# Patient Record
Sex: MALE | Race: WHITE | Employment: OTHER | URBAN - METROPOLITAN AREA
[De-identification: names, ages, dates, MRNs, and addresses within clinical notes are randomized per-mention and may not be internally consistent; named-entity substitution may affect disease eponyms.]

---

## 2017-09-27 ENCOUNTER — HOSPITAL ENCOUNTER (INPATIENT)
Facility: CLINIC | Age: 82
LOS: 1 days | Discharge: HOME | DRG: 708 | End: 2017-09-28
Attending: UROLOGY | Admitting: UROLOGY
Payer: MEDICARE

## 2017-09-27 ENCOUNTER — ANESTHESIA (OUTPATIENT)
Dept: SURGERY | Facility: CLINIC | Age: 82
DRG: 708 | End: 2017-09-27
Payer: MEDICARE

## 2017-09-27 ENCOUNTER — ANESTHESIA EVENT (OUTPATIENT)
Dept: SURGERY | Facility: CLINIC | Age: 82
DRG: 708 | End: 2017-09-27
Payer: MEDICARE

## 2017-09-27 DIAGNOSIS — C61 PROSTATE CANCER (H): Primary | ICD-10-CM

## 2017-09-27 LAB
ABO + RH BLD: NORMAL
ABO + RH BLD: NORMAL
BLD GP AB SCN SERPL QL: NORMAL
BLOOD BANK CMNT PATIENT-IMP: NORMAL
CREAT SERPL-MCNC: 0.98 MG/DL (ref 0.66–1.25)
GFR SERPL CREATININE-BSD FRML MDRD: 73 ML/MIN/1.7M2
POTASSIUM SERPL-SCNC: 3.9 MMOL/L (ref 3.4–5.3)
SPECIMEN EXP DATE BLD: NORMAL

## 2017-09-27 PROCEDURE — 86901 BLOOD TYPING SEROLOGIC RH(D): CPT | Performed by: SURGERY

## 2017-09-27 PROCEDURE — 88305 TISSUE EXAM BY PATHOLOGIST: CPT | Mod: 26 | Performed by: UROLOGY

## 2017-09-27 PROCEDURE — 25800025 ZZH RX 258: Performed by: UROLOGY

## 2017-09-27 PROCEDURE — 82565 ASSAY OF CREATININE: CPT | Performed by: SURGERY

## 2017-09-27 PROCEDURE — 71000015 ZZH RECOVERY PHASE 1 LEVEL 2 EA ADDTL HR: Performed by: UROLOGY

## 2017-09-27 PROCEDURE — A9270 NON-COVERED ITEM OR SERVICE: HCPCS | Performed by: UROLOGY

## 2017-09-27 PROCEDURE — S0020 INJECTION, BUPIVICAINE HYDRO: HCPCS | Performed by: UROLOGY

## 2017-09-27 PROCEDURE — 0VT30ZZ RESECTION OF BILATERAL SEMINAL VESICLES, OPEN APPROACH: ICD-10-PCS | Performed by: UROLOGY

## 2017-09-27 PROCEDURE — 27210995 ZZH RX 272: Performed by: UROLOGY

## 2017-09-27 PROCEDURE — 36000087 ZZH SURGERY LEVEL 8 EA 15 ADDTL MIN: Performed by: UROLOGY

## 2017-09-27 PROCEDURE — 36000085 ZZH SURGERY LEVEL 8 1ST 30 MIN: Performed by: UROLOGY

## 2017-09-27 PROCEDURE — 0VBQ0ZZ EXCISION OF BILATERAL VAS DEFERENS, OPEN APPROACH: ICD-10-PCS | Performed by: UROLOGY

## 2017-09-27 PROCEDURE — 37000009 ZZH ANESTHESIA TECHNICAL FEE, EACH ADDTL 15 MIN: Performed by: UROLOGY

## 2017-09-27 PROCEDURE — 25000128 H RX IP 250 OP 636: Performed by: UROLOGY

## 2017-09-27 PROCEDURE — 27211024 ZZHC OR SUPPLY OTHER OPNP: Performed by: UROLOGY

## 2017-09-27 PROCEDURE — 40000169 ZZH STATISTIC PRE-PROCEDURE ASSESSMENT I: Performed by: UROLOGY

## 2017-09-27 PROCEDURE — 88305 TISSUE EXAM BY PATHOLOGIST: CPT | Performed by: UROLOGY

## 2017-09-27 PROCEDURE — 37000008 ZZH ANESTHESIA TECHNICAL FEE, 1ST 30 MIN: Performed by: UROLOGY

## 2017-09-27 PROCEDURE — 88309 TISSUE EXAM BY PATHOLOGIST: CPT | Performed by: UROLOGY

## 2017-09-27 PROCEDURE — 88309 TISSUE EXAM BY PATHOLOGIST: CPT | Mod: 26 | Performed by: UROLOGY

## 2017-09-27 PROCEDURE — 86850 RBC ANTIBODY SCREEN: CPT | Performed by: SURGERY

## 2017-09-27 PROCEDURE — 25000128 H RX IP 250 OP 636: Performed by: SURGERY

## 2017-09-27 PROCEDURE — 86900 BLOOD TYPING SEROLOGIC ABO: CPT | Performed by: SURGERY

## 2017-09-27 PROCEDURE — 8E0W0CZ ROBOTIC ASSISTED PROCEDURE OF TRUNK REGION, OPEN APPROACH: ICD-10-PCS | Performed by: UROLOGY

## 2017-09-27 PROCEDURE — 27210794 ZZH OR GENERAL SUPPLY STERILE: Performed by: UROLOGY

## 2017-09-27 PROCEDURE — 25000566 ZZH SEVOFLURANE, EA 15 MIN: Performed by: UROLOGY

## 2017-09-27 PROCEDURE — 25000125 ZZHC RX 250: Performed by: UROLOGY

## 2017-09-27 PROCEDURE — 25000132 ZZH RX MED GY IP 250 OP 250 PS 637: Mod: GY | Performed by: UROLOGY

## 2017-09-27 PROCEDURE — 84132 ASSAY OF SERUM POTASSIUM: CPT | Performed by: SURGERY

## 2017-09-27 PROCEDURE — 25000125 ZZHC RX 250: Performed by: NURSE ANESTHETIST, CERTIFIED REGISTERED

## 2017-09-27 PROCEDURE — 25000128 H RX IP 250 OP 636: Performed by: NURSE ANESTHETIST, CERTIFIED REGISTERED

## 2017-09-27 PROCEDURE — A9270 NON-COVERED ITEM OR SERVICE: HCPCS | Mod: GY | Performed by: UROLOGY

## 2017-09-27 PROCEDURE — 12000000 ZZH R&B MED SURG/OB

## 2017-09-27 PROCEDURE — 71000014 ZZH RECOVERY PHASE 1 LEVEL 2 FIRST HR: Performed by: UROLOGY

## 2017-09-27 PROCEDURE — 25000125 ZZHC RX 250: Performed by: SURGERY

## 2017-09-27 PROCEDURE — 0VT00ZZ RESECTION OF PROSTATE, OPEN APPROACH: ICD-10-PCS | Performed by: UROLOGY

## 2017-09-27 PROCEDURE — 36415 COLL VENOUS BLD VENIPUNCTURE: CPT | Performed by: SURGERY

## 2017-09-27 RX ORDER — SODIUM CHLORIDE, SODIUM LACTATE, POTASSIUM CHLORIDE, CALCIUM CHLORIDE 600; 310; 30; 20 MG/100ML; MG/100ML; MG/100ML; MG/100ML
INJECTION, SOLUTION INTRAVENOUS CONTINUOUS PRN
Status: DISCONTINUED | OUTPATIENT
Start: 2017-09-27 | End: 2017-09-27

## 2017-09-27 RX ORDER — LIDOCAINE 40 MG/G
CREAM TOPICAL
Status: DISCONTINUED | OUTPATIENT
Start: 2017-09-27 | End: 2017-09-28 | Stop reason: HOSPADM

## 2017-09-27 RX ORDER — PROPOFOL 10 MG/ML
INJECTION, EMULSION INTRAVENOUS CONTINUOUS PRN
Status: DISCONTINUED | OUTPATIENT
Start: 2017-09-27 | End: 2017-09-27

## 2017-09-27 RX ORDER — CALCIUM CARBONATE 500 MG/1
500-1000 TABLET, CHEWABLE ORAL 4 TIMES DAILY PRN
Status: DISCONTINUED | OUTPATIENT
Start: 2017-09-27 | End: 2017-09-28 | Stop reason: HOSPADM

## 2017-09-27 RX ORDER — HYDROMORPHONE HYDROCHLORIDE 1 MG/ML
.3-.5 INJECTION, SOLUTION INTRAMUSCULAR; INTRAVENOUS; SUBCUTANEOUS EVERY 5 MIN PRN
Status: DISCONTINUED | OUTPATIENT
Start: 2017-09-27 | End: 2017-09-27 | Stop reason: HOSPADM

## 2017-09-27 RX ORDER — AMOXICILLIN 250 MG
1-2 CAPSULE ORAL 2 TIMES DAILY
Status: DISCONTINUED | OUTPATIENT
Start: 2017-09-27 | End: 2017-09-28 | Stop reason: HOSPADM

## 2017-09-27 RX ORDER — KETOROLAC TROMETHAMINE 30 MG/ML
15 INJECTION, SOLUTION INTRAMUSCULAR; INTRAVENOUS
Status: DISCONTINUED | OUTPATIENT
Start: 2017-09-27 | End: 2017-09-27 | Stop reason: HOSPADM

## 2017-09-27 RX ORDER — NEOSTIGMINE METHYLSULFATE 1 MG/ML
VIAL (ML) INJECTION PRN
Status: DISCONTINUED | OUTPATIENT
Start: 2017-09-27 | End: 2017-09-27

## 2017-09-27 RX ORDER — ONDANSETRON 2 MG/ML
INJECTION INTRAMUSCULAR; INTRAVENOUS PRN
Status: DISCONTINUED | OUTPATIENT
Start: 2017-09-27 | End: 2017-09-27

## 2017-09-27 RX ORDER — NAPROXEN 250 MG/1
250 TABLET ORAL EVERY 12 HOURS PRN
Status: DISCONTINUED | OUTPATIENT
Start: 2017-09-27 | End: 2017-09-28 | Stop reason: HOSPADM

## 2017-09-27 RX ORDER — PROPOFOL 10 MG/ML
INJECTION, EMULSION INTRAVENOUS PRN
Status: DISCONTINUED | OUTPATIENT
Start: 2017-09-27 | End: 2017-09-27

## 2017-09-27 RX ORDER — FENTANYL CITRATE 0.05 MG/ML
25-50 INJECTION, SOLUTION INTRAMUSCULAR; INTRAVENOUS
Status: DISCONTINUED | OUTPATIENT
Start: 2017-09-27 | End: 2017-09-27 | Stop reason: HOSPADM

## 2017-09-27 RX ORDER — ONDANSETRON 2 MG/ML
4 INJECTION INTRAMUSCULAR; INTRAVENOUS EVERY 30 MIN PRN
Status: DISCONTINUED | OUTPATIENT
Start: 2017-09-27 | End: 2017-09-27 | Stop reason: HOSPADM

## 2017-09-27 RX ORDER — NEOMYCIN/BACITRACIN/POLYMYXINB 3.5-400-5K
OINTMENT (GRAM) TOPICAL
Status: DISCONTINUED | OUTPATIENT
Start: 2017-09-27 | End: 2017-09-28 | Stop reason: HOSPADM

## 2017-09-27 RX ORDER — DIPHENHYDRAMINE HYDROCHLORIDE 50 MG/ML
12.5 INJECTION INTRAMUSCULAR; INTRAVENOUS EVERY 6 HOURS PRN
Status: DISCONTINUED | OUTPATIENT
Start: 2017-09-27 | End: 2017-09-28 | Stop reason: HOSPADM

## 2017-09-27 RX ORDER — EPHEDRINE SULFATE 50 MG/ML
INJECTION, SOLUTION INTRAMUSCULAR; INTRAVENOUS; SUBCUTANEOUS PRN
Status: DISCONTINUED | OUTPATIENT
Start: 2017-09-27 | End: 2017-09-27

## 2017-09-27 RX ORDER — GABAPENTIN 100 MG/1
100 CAPSULE ORAL 3 TIMES DAILY
Status: DISCONTINUED | OUTPATIENT
Start: 2017-09-27 | End: 2017-09-28 | Stop reason: HOSPADM

## 2017-09-27 RX ORDER — ONDANSETRON 4 MG/1
4 TABLET, ORALLY DISINTEGRATING ORAL EVERY 30 MIN PRN
Status: DISCONTINUED | OUTPATIENT
Start: 2017-09-27 | End: 2017-09-27 | Stop reason: HOSPADM

## 2017-09-27 RX ORDER — ACETAMINOPHEN 325 MG/1
650 TABLET ORAL EVERY 4 HOURS PRN
Status: DISCONTINUED | OUTPATIENT
Start: 2017-09-30 | End: 2017-09-28 | Stop reason: HOSPADM

## 2017-09-27 RX ORDER — ACETAMINOPHEN 325 MG/1
975 TABLET ORAL EVERY 8 HOURS
Status: DISCONTINUED | OUTPATIENT
Start: 2017-09-27 | End: 2017-09-27

## 2017-09-27 RX ORDER — FENTANYL CITRATE 50 UG/ML
INJECTION, SOLUTION INTRAMUSCULAR; INTRAVENOUS PRN
Status: DISCONTINUED | OUTPATIENT
Start: 2017-09-27 | End: 2017-09-27

## 2017-09-27 RX ORDER — BUPIVACAINE HYDROCHLORIDE 2.5 MG/ML
INJECTION, SOLUTION INFILTRATION; PERINEURAL PRN
Status: DISCONTINUED | OUTPATIENT
Start: 2017-09-27 | End: 2017-09-27 | Stop reason: HOSPADM

## 2017-09-27 RX ORDER — SODIUM CHLORIDE, SODIUM LACTATE, POTASSIUM CHLORIDE, CALCIUM CHLORIDE 600; 310; 30; 20 MG/100ML; MG/100ML; MG/100ML; MG/100ML
INJECTION, SOLUTION INTRAVENOUS CONTINUOUS
Status: DISCONTINUED | OUTPATIENT
Start: 2017-09-27 | End: 2017-09-27 | Stop reason: HOSPADM

## 2017-09-27 RX ORDER — DEXAMETHASONE SODIUM PHOSPHATE 4 MG/ML
INJECTION, SOLUTION INTRA-ARTICULAR; INTRALESIONAL; INTRAMUSCULAR; INTRAVENOUS; SOFT TISSUE PRN
Status: DISCONTINUED | OUTPATIENT
Start: 2017-09-27 | End: 2017-09-27

## 2017-09-27 RX ORDER — LIDOCAINE HYDROCHLORIDE 20 MG/ML
INJECTION, SOLUTION INFILTRATION; PERINEURAL PRN
Status: DISCONTINUED | OUTPATIENT
Start: 2017-09-27 | End: 2017-09-27

## 2017-09-27 RX ORDER — ONDANSETRON 2 MG/ML
4 INJECTION INTRAMUSCULAR; INTRAVENOUS EVERY 6 HOURS PRN
Status: DISCONTINUED | OUTPATIENT
Start: 2017-09-27 | End: 2017-09-28 | Stop reason: HOSPADM

## 2017-09-27 RX ORDER — VECURONIUM BROMIDE 1 MG/ML
INJECTION, POWDER, LYOPHILIZED, FOR SOLUTION INTRAVENOUS PRN
Status: DISCONTINUED | OUTPATIENT
Start: 2017-09-27 | End: 2017-09-27

## 2017-09-27 RX ORDER — ACETAMINOPHEN 325 MG/1
650 TABLET ORAL EVERY 4 HOURS PRN
Status: DISCONTINUED | OUTPATIENT
Start: 2017-09-30 | End: 2017-09-27

## 2017-09-27 RX ORDER — ONDANSETRON 4 MG/1
4 TABLET, ORALLY DISINTEGRATING ORAL EVERY 6 HOURS PRN
Status: DISCONTINUED | OUTPATIENT
Start: 2017-09-27 | End: 2017-09-28 | Stop reason: HOSPADM

## 2017-09-27 RX ORDER — HYDROMORPHONE HYDROCHLORIDE 2 MG/1
2-4 TABLET ORAL
Status: DISCONTINUED | OUTPATIENT
Start: 2017-09-27 | End: 2017-09-28 | Stop reason: HOSPADM

## 2017-09-27 RX ORDER — ACETAMINOPHEN 325 MG/1
975 TABLET ORAL EVERY 8 HOURS
Status: DISCONTINUED | OUTPATIENT
Start: 2017-09-27 | End: 2017-09-28 | Stop reason: HOSPADM

## 2017-09-27 RX ORDER — NEOMYCIN/BACITRACIN/POLYMYXINB 3.5-400-5K
OINTMENT (GRAM) TOPICAL 2 TIMES DAILY
Status: DISCONTINUED | OUTPATIENT
Start: 2017-09-27 | End: 2017-09-28 | Stop reason: HOSPADM

## 2017-09-27 RX ORDER — CEFAZOLIN SODIUM 2 G/100ML
2 INJECTION, SOLUTION INTRAVENOUS
Status: COMPLETED | OUTPATIENT
Start: 2017-09-27 | End: 2017-09-27

## 2017-09-27 RX ORDER — MAGNESIUM HYDROXIDE 1200 MG/15ML
LIQUID ORAL PRN
Status: DISCONTINUED | OUTPATIENT
Start: 2017-09-27 | End: 2017-09-27 | Stop reason: HOSPADM

## 2017-09-27 RX ORDER — GLYCOPYRROLATE 0.2 MG/ML
INJECTION, SOLUTION INTRAMUSCULAR; INTRAVENOUS PRN
Status: DISCONTINUED | OUTPATIENT
Start: 2017-09-27 | End: 2017-09-27

## 2017-09-27 RX ORDER — NALOXONE HYDROCHLORIDE 0.4 MG/ML
.1-.4 INJECTION, SOLUTION INTRAMUSCULAR; INTRAVENOUS; SUBCUTANEOUS
Status: DISCONTINUED | OUTPATIENT
Start: 2017-09-27 | End: 2017-09-28 | Stop reason: HOSPADM

## 2017-09-27 RX ORDER — SODIUM CHLORIDE 9 MG/ML
INJECTION, SOLUTION INTRAVENOUS CONTINUOUS
Status: DISCONTINUED | OUTPATIENT
Start: 2017-09-27 | End: 2017-09-28 | Stop reason: HOSPADM

## 2017-09-27 RX ORDER — METOPROLOL TARTRATE 1 MG/ML
1-2 INJECTION, SOLUTION INTRAVENOUS EVERY 5 MIN PRN
Status: DISCONTINUED | OUTPATIENT
Start: 2017-09-27 | End: 2017-09-27 | Stop reason: HOSPADM

## 2017-09-27 RX ORDER — HYDROMORPHONE HYDROCHLORIDE 2 MG/1
2-4 TABLET ORAL EVERY 4 HOURS PRN
Status: DISCONTINUED | OUTPATIENT
Start: 2017-09-27 | End: 2017-09-27

## 2017-09-27 RX ORDER — DIPHENHYDRAMINE HCL 12.5MG/5ML
12.5 LIQUID (ML) ORAL EVERY 6 HOURS PRN
Status: DISCONTINUED | OUTPATIENT
Start: 2017-09-27 | End: 2017-09-28 | Stop reason: HOSPADM

## 2017-09-27 RX ADMIN — DEXMEDETOMIDINE HYDROCHLORIDE 0.6 MCG/KG/HR: 100 INJECTION, SOLUTION INTRAVENOUS at 12:36

## 2017-09-27 RX ADMIN — SODIUM CHLORIDE, POTASSIUM CHLORIDE, SODIUM LACTATE AND CALCIUM CHLORIDE: 600; 310; 30; 20 INJECTION, SOLUTION INTRAVENOUS at 12:43

## 2017-09-27 RX ADMIN — Medication 5 MG: at 15:08

## 2017-09-27 RX ADMIN — Medication 5 MG: at 15:19

## 2017-09-27 RX ADMIN — VECURONIUM BROMIDE 1 MG: 1 INJECTION, POWDER, LYOPHILIZED, FOR SOLUTION INTRAVENOUS at 14:57

## 2017-09-27 RX ADMIN — PROPOFOL 150 MG: 10 INJECTION, EMULSION INTRAVENOUS at 12:36

## 2017-09-27 RX ADMIN — GLYCOPYRROLATE 0.1 MG: 0.2 INJECTION, SOLUTION INTRAMUSCULAR; INTRAVENOUS at 14:04

## 2017-09-27 RX ADMIN — VECURONIUM BROMIDE 2 MG: 1 INJECTION, POWDER, LYOPHILIZED, FOR SOLUTION INTRAVENOUS at 14:15

## 2017-09-27 RX ADMIN — VECURONIUM BROMIDE 3 MG: 1 INJECTION, POWDER, LYOPHILIZED, FOR SOLUTION INTRAVENOUS at 13:08

## 2017-09-27 RX ADMIN — LIDOCAINE HYDROCHLORIDE 1 ML: 10 INJECTION, SOLUTION EPIDURAL; INFILTRATION; INTRACAUDAL; PERINEURAL at 11:35

## 2017-09-27 RX ADMIN — DEXMEDETOMIDINE HYDROCHLORIDE: 100 INJECTION, SOLUTION INTRAVENOUS at 14:34

## 2017-09-27 RX ADMIN — PROPOFOL 150 MCG/KG/MIN: 10 INJECTION, EMULSION INTRAVENOUS at 12:36

## 2017-09-27 RX ADMIN — HYDROMORPHONE HYDROCHLORIDE 0.5 MG: 1 INJECTION, SOLUTION INTRAMUSCULAR; INTRAVENOUS; SUBCUTANEOUS at 14:43

## 2017-09-27 RX ADMIN — Medication 5 MG: at 15:05

## 2017-09-27 RX ADMIN — GABAPENTIN 100 MG: 100 CAPSULE ORAL at 21:57

## 2017-09-27 RX ADMIN — HYDROMORPHONE HYDROCHLORIDE 2 MG: 2 TABLET ORAL at 18:47

## 2017-09-27 RX ADMIN — SODIUM CHLORIDE, POTASSIUM CHLORIDE, SODIUM LACTATE AND CALCIUM CHLORIDE: 600; 310; 30; 20 INJECTION, SOLUTION INTRAVENOUS at 14:15

## 2017-09-27 RX ADMIN — PROPOFOL: 10 INJECTION, EMULSION INTRAVENOUS at 14:05

## 2017-09-27 RX ADMIN — ACETAMINOPHEN 975 MG: 325 TABLET, FILM COATED ORAL at 21:58

## 2017-09-27 RX ADMIN — SODIUM CHLORIDE, POTASSIUM CHLORIDE, SODIUM LACTATE AND CALCIUM CHLORIDE: 600; 310; 30; 20 INJECTION, SOLUTION INTRAVENOUS at 11:35

## 2017-09-27 RX ADMIN — FENTANYL CITRATE 100 MCG: 50 INJECTION, SOLUTION INTRAMUSCULAR; INTRAVENOUS at 12:36

## 2017-09-27 RX ADMIN — FENTANYL CITRATE 50 MCG: 50 INJECTION, SOLUTION INTRAMUSCULAR; INTRAVENOUS at 13:08

## 2017-09-27 RX ADMIN — GLYCOPYRROLATE 0.7 MG: 0.2 INJECTION, SOLUTION INTRAMUSCULAR; INTRAVENOUS at 15:23

## 2017-09-27 RX ADMIN — GABAPENTIN 100 MG: 100 CAPSULE ORAL at 18:47

## 2017-09-27 RX ADMIN — CEFAZOLIN SODIUM 2 G: 2 INJECTION, SOLUTION INTRAVENOUS at 12:41

## 2017-09-27 RX ADMIN — LIDOCAINE HYDROCHLORIDE 70 MG: 20 INJECTION, SOLUTION INFILTRATION; PERINEURAL at 12:36

## 2017-09-27 RX ADMIN — VECURONIUM BROMIDE 2 MG: 1 INJECTION, POWDER, LYOPHILIZED, FOR SOLUTION INTRAVENOUS at 13:48

## 2017-09-27 RX ADMIN — NEOSTIGMINE METHYLSULFATE 3.5 MG: 1 INJECTION INTRAMUSCULAR; INTRAVENOUS; SUBCUTANEOUS at 15:23

## 2017-09-27 RX ADMIN — BACITRACIN ZINC, NEOMYCIN, POLYMYXIN B: 400; 3.5; 5 OINTMENT TOPICAL at 21:57

## 2017-09-27 RX ADMIN — ONDANSETRON 4 MG: 2 INJECTION INTRAMUSCULAR; INTRAVENOUS at 14:43

## 2017-09-27 RX ADMIN — DEXAMETHASONE SODIUM PHOSPHATE 4 MG: 4 INJECTION, SOLUTION INTRA-ARTICULAR; INTRALESIONAL; INTRAMUSCULAR; INTRAVENOUS; SOFT TISSUE at 12:48

## 2017-09-27 RX ADMIN — SODIUM CHLORIDE: 9 INJECTION, SOLUTION INTRAVENOUS at 18:03

## 2017-09-27 RX ADMIN — SENNOSIDES AND DOCUSATE SODIUM 2 TABLET: 8.6; 5 TABLET ORAL at 21:58

## 2017-09-27 RX ADMIN — ROCURONIUM BROMIDE 50 MG: 10 INJECTION INTRAVENOUS at 12:36

## 2017-09-27 RX ADMIN — CEFAZOLIN SODIUM 1 G: 2 INJECTION, SOLUTION INTRAVENOUS at 14:40

## 2017-09-27 RX ADMIN — Medication 5 MG: at 15:22

## 2017-09-27 ASSESSMENT — PAIN DESCRIPTION - DESCRIPTORS: DESCRIPTORS: DULL;ACHING

## 2017-09-27 ASSESSMENT — ENCOUNTER SYMPTOMS: DYSRHYTHMIAS: 0

## 2017-09-27 NOTE — ANESTHESIA CARE TRANSFER NOTE
Patient: Shorty Beverly    Procedure(s):  DAVINCI XI PROSTATECTOMY  - Wound Class: II-Clean Contaminated    Diagnosis: PROSTATE CA  Diagnosis Additional Information: No value filed.    Anesthesia Type:   General, ETT     Note:  Airway :Face Mask and Oral Airway  Patient transferred to:PACU  Comments: Pt to PACU with oral airway in place and O2 via mask, airway patent, VSS.  Report to RN.      Vitals: (Last set prior to Anesthesia Care Transfer)    CRNA VITALS  9/27/2017 1527 - 9/27/2017 1607      9/27/2017             Pulse: 67    SpO2: 99 %    Resp Rate (observed): 10    Resp Rate (set): 10                Electronically Signed By: NIKKI Sandhu CRNA  September 27, 2017  4:07 PM

## 2017-09-27 NOTE — PROGRESS NOTES
Admission medication history interview status for the 9/27/2017  admission is complete. See EPIC admission navigator for prior to admission medications     Medication history source reliability:Good    Medication history interview source(s):Patient    Medication history resources (including written lists, pill bottles, clinic record):Patient mailed in his medication list prior to surgery    Primary pharmacy.[email or web address]    Additional medication history information not noted on PTA med list :None    Time spent in this activity: 40 minutes    Prior to Admission medications    Medication Sig Last Dose Taking? Auth Provider   GLUCOSAMINE-CHONDROITIN PO Take 1 tablet by mouth daily 1500mg/1200mg 9/25/2017 at AM Yes Reported, Patient   Cyanocobalamin (VITAMIN B 12 PO) Take 2,500 mcg by mouth daily 9/25/2017 at AM Yes Reported, Patient   Acetaminophen (TYLENOL PO) Take 500-1,000 mg by mouth daily  9/25/2017 at AM Yes Reported, Patient   Cholecalciferol (VITAMIN D3 PO) Take 5,000 Units by mouth daily  9/25/2017 at AM Yes Reported, Patient   multivitamin, therapeutic with minerals (MULTI-VITAMIN) TABS Take 1 tablet by mouth daily. 9/25/2017 at AM Yes Reported, Patient   TAMSULOSIN HCL PO Take 0.4 mg by mouth daily. 9/25/2017 at AM Yes Reported, Patient

## 2017-09-27 NOTE — ANESTHESIA PREPROCEDURE EVALUATION
"Procedure: Procedure(s):  DAVINCI XI PROSTATECTOMY  Preop diagnosis: PROSTATE CA    Allergies   Allergen Reactions     Demerol      Severe drop in b/p     Penicillins Swelling     Doesn't know where swelling was     Valium Other (See Comments)     Hallucinations and \"spaced out\"     Past Medical History:   Diagnosis Date     Anemia      Anxiety      Anxiety state, unspecified      Arthritis     osteoarthritis of both knees     BPH (benign prostatic hyperplasia)      Hyperlipemia      Hypertension      Infected prosthetic knee joint (H)      Prostate hypertrophy      Spinal stenosis      Tremor      Tremor, essential      Past Surgical History:   Procedure Laterality Date     ARTHROPLASTY REVISION KNEE  1/17/2013    Procedure: ARTHROPLASTY REVISION KNEE;  LEFT TOTAL KNEE REVISION (KARINA OUT)^; PLACE ANTIOBIOTIC SPACER (BIOMET)^;  Surgeon: Fátima Ivan MD;  Location:  OR     ARTHROPLASTY REVISION KNEE  3/26/2013    Procedure: ARTHROPLASTY REVISION KNEE;  LEFT REVISION TOTAL KNEE ARTHROPLASTY (J&J)^;  Surgeon: Fátima Ivan MD;  Location:  OR     COLONOSCOPY       DECOMPRESSION, FUSION LUMBAR POSTERIOR THREE + LEVELS, COMBINED N/A 10/11/2016    Procedure: COMBINED DECOMPRESSION, FUSION LUMBAR POSTERIOR THREE + LEVELS;  Surgeon: Armand Duarte MD;  Location:  OR     ENT SURGERY      T&A     EYE SURGERY      T and A     HEMORRHOIDECTOMY       HERNIA REPAIR      right     INCISION LIMBAL RELAXING, KERATOTOMY ARCUATE  7/15/2014    Procedure: INCISION LIMBAL RELAXING, KERATOTOMY ARCUATE;  Surgeon: Nicolás Og MD;  Location: [facility]     ORTHOPEDIC SURGERY      elizabeth total knees,trigger fingerx2     PHACOEMULSIFICATION CLEAR CORNEA WITH STANDARD INTRAOCULAR LENS IMPLANT  4/22/2014    Procedure: PHACOEMULSIFICATION CLEAR CORNEA WITH STANDARD INTRAOCULAR LENS IMPLANT;  Surgeon: Nicolás Og MD;  Location: [facility]     PHACOEMULSIFICATION CLEAR CORNEA WITH STANDARD INTRAOCULAR LENS IMPLANT  7/15/2014    " Procedure: PHACOEMULSIFICATION CLEAR CORNEA WITH STANDARD INTRAOCULAR LENS IMPLANT;  Surgeon: Nicolás Og MD;  Location: [facility]     Prior to Admission medications    Medication Sig Start Date End Date Taking? Authorizing Provider   GLUCOSAMINE-CHONDROITIN PO Take 1 tablet by mouth daily 1500mg/1200mg   Yes Reported, Patient   Cyanocobalamin (VITAMIN B 12 PO) Take 2,500 mcg by mouth daily   Yes Reported, Patient   Acetaminophen (TYLENOL PO) Take 500-1,000 mg by mouth daily    Yes Reported, Patient   Cholecalciferol (VITAMIN D3 PO) Take 5,000 Units by mouth daily    Yes Reported, Patient   multivitamin, therapeutic with minerals (MULTI-VITAMIN) TABS Take 1 tablet by mouth daily.   Yes Reported, Patient   TAMSULOSIN HCL PO Take 0.4 mg by mouth daily.   Yes Reported, Patient     Current Facility-Administered Medications Ordered in Epic   Medication Dose Route Frequency Last Rate Last Dose     lidocaine 1 % 1 mL  1 mL Other Q1H PRN         lactated ringers infusion   Intravenous Continuous         No current [facility]-ordered outpatient prescriptions on file.     Wt Readings from Last 1 Encounters:   10/11/16 76 kg (167 lb 8 oz)     Temp Readings from Last 1 Encounters:   10/14/16 36.8  C (98.2  F) (Oral)     BP Readings from Last 6 Encounters:   10/14/16 109/62   11/29/15 116/78   07/15/14 120/85   04/22/14 116/76   03/29/13 98/57   01/20/13 122/69     Pulse Readings from Last 4 Encounters:   10/12/16 79     Resp Readings from Last 1 Encounters:   10/14/16 16     SpO2 Readings from Last 1 Encounters:   10/14/16 95%     Recent Labs   Lab Test  10/14/16   0800  11/29/15   1835  03/26/13   1316   NA   --   138  141   POTASSIUM   --   4.0  4.5   CHLORIDE   --   106  103   CO2   --   26  28   ANIONGAP   --   6  11   GLC   --   120*  92   BUN   --   22  20   CR  0.88  1.15  0.83   MARIO   --   8.4*  9.8     Recent Labs   Lab Test  11/29/15   1835   AST  15   ALT  19     Recent Labs   Lab Test  10/12/16   0700  11/29/15   1835    03/26/13   1316   WBC   --   12.6*   --   5.7   HGB  9.7*  13.6   < >  12.9*   PLT   --   173   --   196    < > = values in this interval not displayed.     Recent Labs   Lab Test  11/29/15   1835  03/29/13   0622   INR  1.09  1.51*      No results for input(s): TROPI in the last [id number] hours.  RECENT LABS:   ECG:   ECHO:   CXR:      Anesthesia Evaluation     .             ROS/MED HX    ENT/Pulmonary:  - neg pulmonary ROS     Neurologic:     (+)other neuro tremor, essential; spinal stenosis s/p lumbar    Cardiovascular:     (+) Dyslipidemia, hypertension----. : . . . :. .      (-) CAD, CHF, DELEON and arrhythmias   METS/Exercise Tolerance:  >4 METS   Hematologic:     (+) Anemia, -      Musculoskeletal:   (+) arthritis, , , -       GI/Hepatic:  - neg GI/hepatic ROS       Renal/Genitourinary:     (+) BPH,       Endo:  - neg endo ROS       Psychiatric:     (+) psychiatric history anxiety      Infectious Disease:  - neg infectious disease ROS       Malignancy:         Other:                     Physical Exam  Normal systems: dental    Airway   Mallampati: I  TM distance: >3 FB  Neck ROM: full    Dental     Cardiovascular   Rhythm and rate: regular and normal      Pulmonary    breath sounds clear to auscultation                    Anesthesia Plan      History & Physical Review  History and physical reviewed and following examination; no interval change.    ASA Status:  2 .    NPO Status:  > 8 hours    Plan for General and ETT with Intravenous and Propofol induction. Maintenance will be Balanced.    PONV prophylaxis:  Ondansetron (or other 5HT-3) and Dexamethasone or Solumedrol  precedex gtt  Propofol gtt  Less than 0.5 mac inhalational  No midazolam        Postoperative Care  Postoperative pain management:  IV analgesics.      Consents  Anesthetic plan, risks, benefits and alternatives discussed with:  Patient..                          .

## 2017-09-27 NOTE — IP AVS SNAPSHOT
MRN:[id number]                      After Visit Summary   9/27/2017    Shorty Beverly    MRN: [id number]           Thank you!     Thank you for choosing [facility] for your care. Our goal is always to provide you with excellent care. Hearing back from our patients is one way we can continue to improve our services. Please take a few minutes to complete the written survey that you may receive in the mail after you visit with us. Thank you!        Patient Information     Date Of Birth          1935        Designated Caregiver       Most Recent Value    Caregiver    Will someone help with your care after discharge? yes    Name of designated caregiver Pat spouse    Phone number of caregiver [phone]    Caregiver address [address] [place] [address]      About your hospital stay     You were admitted on:  September 27, 2017 You last received care in the:  [facility] [address] [facility]    You were discharged on:  September 28, 2017        Reason for your hospital stay       You were in the hospital to have surgery on your prostate and to recover from surgery                  Who to Call     For medical emergencies, please call 911.  For non-urgent questions about your medical care, please call your primary care provider or clinic, [phone]  For questions related to your surgery, please call your surgery clinic        Attending Provider     Provider Specialty    Jean Montoya MD Urology       Primary Care Provider Office Phone # Fax #    Christian Escobar [phone]      After Care Instructions     Activity       Your activity upon discharge: Avoid strenuous activity, including lifting over 15 pounds or straining for a bowel movement, for the next month while you are healing from surgery. No driving while a catheter is in place or if you are taking narcotic pain meds (Percocet, Norco, Oxycodone, Hydrocodone, etc.)            Diet       Be sure to keep  yourself well hydrated by drinking plenty of fluids & eat smaller, more frequent meals while you are healing.Try to eat more healthy foods & avoid junk food.            Discharge Instructions       -You have a dave catheter in place to allow for healing of your urinary system.   You will need to see your urology doctor to have the catheter checked and subsequently removed at your outpatient follow up appointment.  -Do not soak or take a tub bath (no swimming or going in a hot tub) while your catheter remains in place. You may shower with the catheter in. You may use a wet or soapy wash cloth to clean the outside of the catheter and the groin area as needed for hygiene, just make sure to avoid pulling on the catheter. Always secure the catheter to your thigh to prevent pulling.  -Look at the appearance and amount of drainage that comes from the catheter into the bag. Call the [facility] office if the drain falls out, the drainage has bright red blood that does not clear within 48 hours, the drainage has a foul smell, the drainage has pus in it or you have any other concerns.  -For male patients: You may apply a small amount of bacitracin/neosporin ointment to the tip of your penis 2-4 times daily while your catheter is in place to help reduce irritation.    Your incision was closed using surgical staples that will be removed at your follow up appointment. You can leave the stapled incision open to air 24 hours after surgery. You should remove the bandages over your incisions within 48 hours after surgery (if they're left on longer, the bonding of the adhesive to your skin may become firmly attached and remove skin when they're removed, causing a painful sore). There are no specific cares needed aside from keeping the area clean and dry, but you can use an over the counter ointment such as bacitracin or neosporin on the incisions if you prefer. You may shower normally, but avoid scrubbing that area or using  harsh soaps. Pat dry after shower. Do not soak in the tub, swim, or otherwise submerge your surgical site in water until your staples have been removed and your incision is well healed.    The SEB drain site will ooze for the first 24-48 hours after the drain is removed. You should keep the dressing over the site until it scabs over and you should keep the area dry during this time. After 24-48 hours, you can remove the dressing and leave it open to air. After 24-48 hours, you can get the site wet, but you should avoid submerging it in water (no tub baths, no swimming, etc) until it is well-healed (2-4 weeks).     You should expect some gas and bloating discomfort in your abdomen after surgery, particularly for the first week. This is normal. To help manage this, you should eat small light meals, take frequent short walks, and use as little narcotic pain medicine as you can to maintain a comfortable pain level,    You may resume your over the counter aspirin, NSAIDS (ibuprofen, naproxen, aleve, etc.), and vitamins/dietary supplements 24 hours after your catheter has been removed.    You are being prescribed a narcotic pain medication. This is a strong pain medication that should be used to keep your pain to a tolerable level, but no more than necessary as it can be addictive and has side effects. With the narcotic pain medicine, you might experience some constipation, so you may want to take an over the counter stool softener like dulcolax or a fiber supplement such as Metamucil or Benefiber to keep your bowel movements soft and regular. You should also be very careful with driving, as the pain medicine may dull your senses and reflexes. Avoid alcohol, as this can unexpectedly increase the effect of the pain medicine.    If there are any problems that concern you enough to visit an emergency room in the next month or two, please return to the hospital you had surgery at, as the staff at that hospital will be familiar  "with you, your procedure, and your surgeon as well as have access to all of your records to make sure your surgical site is healing well.                  Follow-up Appointments     Follow Up       Please follow up with our office nurse on Wednesday, 10/11/17 at 9:30am for a routine post-hospital check and catheter removal    [facility]  [address] [place] [address], [address]  [place]. [address]    You may call [phone] with any questions or concerns or if you need to cancel/reschedule appointment.            Follow Up       Please follow up with our office nurse on Friday, 10/6/17 at 1pm for a routine post-hospital check and staple removal    [facility]  [address], [address]  [place]. [address]    You may call [phone] with any questions or concerns or if you need to cancel/reschedule appointment.                  Pending Results     No orders found for last 3 day(s).            Statement of Approval     Ordered          09/28/17 1565  I have reviewed and agree with all the recommendations and orders detailed in this document.  EFFECTIVE NOW     Approved and electronically signed by:  Isa Cameron PA-C             Admission Information     Date & Time Provider Department Dept. Phone    9/27/2017 Jean Montoya MD [facility] [address] [facility] [phone]      Your Vitals Were     Blood Pressure Temperature Respirations Height Weight Pulse Oximetry    110/65 (BP Location: Right arm) 98.4  F (36.9  C) (Oral) 16 1.651 m (5' 5\") 73 kg (161 lb) 98%    BMI (Body Mass Index)                   26.79 kg/m2           [email or web address] Information     [employer] lets you send messages to your doctor, view your test results, renew your prescriptions, schedule appointments and more. To sign up, go to [email or web address] . Click on \"Log in\" on the left side of the screen, which will take you to the Welcome page. Then click on \"Sign up Now\" on the right side of the page. "     You will be asked to enter the access code listed below, as well as some personal information. Please follow the directions to create your username and password.     Your access code is: [email or web address]  Expires: 2017  5:29 PM     Your access code will  in 90 days. If you need help or a new code, please call your [facility] clinic or [phone].        Care EveryWhere ID     This is your Care EveryWhere ID. This could be used by other organizations to access your [facility] medical records  [id number]        Equal Access to Services     [facility]: Hadchrissy Logan, alix saleem, saranya delacruz, sommer golden . So [facility] [phone].    ATENCIÓN: Si habla español, tiene a warren disposición servicios gratuitos de asistencia lingüística. Llame al [phone].    We comply with applicable federal civil rights laws and [place] laws. We do not discriminate on the basis of race, color, national origin, age, disability sex, sexual orientation or gender identity.               Review of your medicines      START taking        Dose / Directions    oxyCODONE 5 MG IR tablet   Commonly known as:  ROXICODONE   Notes to Patient:  Take as needed for pain.        Dose:  5-10 mg   Take 1-2 tablets (5-10 mg) by mouth every 4 hours as needed for moderate to severe pain   Quantity:  20 tablet   Refills:  0         CONTINUE these medicines which have NOT CHANGED        Dose / Directions    GLUCOSAMINE-CHONDROITIN PO        Dose:  1 tablet   Take 1 tablet by mouth daily 1500mg/1200mg   Refills:  0       Multi-vitamin Tabs tablet        Dose:  1 tablet   Take 1 tablet by mouth daily.   Refills:  0       TYLENOL PO        Dose:  500-1000 mg   Take 500-1,000 mg by mouth daily   Refills:  0       VITAMIN B 12 PO        Dose:  2500 mcg   Take 2,500 mcg by mouth daily   Refills:  0       VITAMIN D3 PO        Dose:  5000 Units   Take 5,000 Units by mouth daily   Refills:  0          STOP taking     TAMSULOSIN HCL PO                Where to get your medicines      Some of these will need a paper prescription and others can be bought over the counter. Ask your nurse if you have questions.     Bring a paper prescription for each of these medications     oxyCODONE 5 MG IR tablet                Protect others around you: Learn how to safely use, store and throw away your medicines at www.disposemymeds.org.             Medication List: This is a list of all your medications and when to take them. Check marks below indicate your daily home schedule. Keep this list as a reference.      Medications           Morning Afternoon Evening Bedtime As Needed    GLUCOSAMINE-CHONDROITIN PO   Take 1 tablet by mouth daily 1500mg/1200mg                                Multi-vitamin Tabs tablet   Take 1 tablet by mouth daily.                                oxyCODONE 5 MG IR tablet   Commonly known as:  ROXICODONE   Take 1-2 tablets (5-10 mg) by mouth every 4 hours as needed for moderate to severe pain   Notes to Patient:  Take as needed for pain.                                   TYLENOL PO   Take 500-1,000 mg by mouth daily   Last time this was given:  975 mg on 9/28/2017  1:00 PM                                VITAMIN B 12 PO   Take 2,500 mcg by mouth daily                                VITAMIN D3 PO   Take 5,000 Units by mouth daily                                          More Information        Discharge Instructions for Radical Prostatectomy  You had a procedure called radical prostatectomy (removal of the entire prostate and surrounding tissues). This sheet will help you know what to do following surgery.  Activity    Don t drive until your healthcare provider says it s OK. This is usually after your catheter is removed and you are no longer taking pain medicine.    For the first 2 weeks after surgery, limit physical activity. This will allow your body to rest and heal.    Talk to your healthcare  provider before going back to your normal activity level.    Don t lift anything heavier than 10 pounds until your healthcare provider says it s OK.    Avoid long car rides.    Avoid climbing stairs and strenuous exercise. Don t mow the lawn or use a vacuum .    Take naps if you feel tired.  Home care    Avoid constipation:    Eat fruits, vegetables, and whole grains.    Unless directed otherwise, drink 6 glasses to 8 glasses of water a day (enough to keep your urine light colored). This will also help keep a healthy flow of urine.    Use a laxative or a stool softener if your healthcare provider says it s OK.    Take care of your catheter. Ask for an information sheet and training before leaving the hospital:    Keep the catheter well secured.    Use either leg bags or external (straight drainage) bags, or both.    Empty your bag when it s half full. You may notice some blood in the bag. This is normal after surgery and while the catheter is in place.    Use plain soap and water to wash the catheter and the head of your penis daily, or more often if needed.    Return to your normal diet.    Shower as usual.    Be sure to finish the antibiotics that your doctor prescribed.    Be sure to take pain medicine if needed and as prescribed.    Consider wearing sweat pants while you have the catheter. They may be more comfortable than other pants.  Follow-up  Make a follow-up appointment as directed.  When to call your healthcare provider  Call your healthcare provider right away if you have any of the following:    Fever above 100.4 F (38 C) or shaking chills    Heavy bleeding, clots, or bright red blood from the catheter    Catheter that falls out or stops draining    Foul-smelling discharge from your catheter    Redness, swelling, warmth, or pain at your incision site    Drainage, pus, or bleeding from your incision    Trouble breathing    Hives or rash    Nausea and vomiting    Diarrhea   Date Last Reviewed:  1/1/2017 2000-2017 [employer]. [address] [place] [address]. All rights reserved. This information is not intended as a substitute for professional medical care. Always follow your healthcare professional's instructions.                Pierson Catheter Care    A Pierson catheter is a rubber tube that is placed through the urethra (opening where urine comes out) and into the bladder. This helps drain urine from the bladder. There is a small balloon on the end of the tube that is inflated after insertion. This keeps the catheter from sliding out of the bladder.  A Pierson catheter is used to treat urinary retention (unable to pass urine). It is also used when there is incontinence (loss of bladder control).  Home care    Finish taking any prescribed antibiotic even if you are feeling better before then.    It is important to keep bacteria from getting into the collection bag. Do not disconnect the catheter from the collection bag.    Use a leg band to secure the drainage tube, so it does not pull on the catheter. Drain the collection bag when it becomes full using the drain spout at the bottom of the bag.    Do not try to pull or remove your catheter. This will injure your urethra. It must be removed by your healthcare provider or nurse.  Follow-up care  Follow up with your healthcare provider as advised for repeat urine testing and catheter removal or replacement.  When to seek medical advice  Call your healthcare provider right away if any of these occur:    Fever of 100.4 F (38 C) or higher, or as directed by your healthcare provider    Bladder pain or fullness    Abdominal swelling, nausea or vomiting, or back pain    Blood or urine leakage around the catheter    Bloody urine coming from the catheter (if a new symptom)    Catheter falls out    Catheter stops draining for 6 hours    Weakness, dizziness, or fainting  Date Last Reviewed: 10/1/2016    4517-3516 [employer]. [facility] [place] [address]. All rights reserved. This information is not intended as a substitute for professional medical care. Always follow your healthcare professional's instructions.                Discharge Instructions: Caring for Your Indwelling Urinary Catheter  You have been discharged with an indwelling urinary catheter (also called a Pierson catheter). A catheter is a thin, flexible tube. An indwelling urinary catheter has two parts. The first part is a tube that drains urine from your bladder. The second part is a bag or other device that collects the urine.  The most important thing to remember is that you want to prevent infection. Always wash your hands before handling your catheter bag or tubing.  Draining the bedside bag    Wash your hands with soap and clean, running water or use an alcohol-based hand  that contains at least 60% alcohol.    Hold the drainage tube over a toilet or measuring container.    Unclamp the tube and let the bag drain.    Don t touch the tip of the drainage tube or let it touch the toilet or container.  Cleaning the drainage tube    When the bag is empty, clean the tip of the drainage tube with an alcohol wipe.    Clamp the tube.    Reinsert the tube into the pocket on the drainage bag.  Cleaning your skin and tubing    Clean the skin near the catheter with soap and water.    Wash your genital area from front to back.    Wash the catheter tubing. Always wash the catheter in the direction away from your body.    You will be told when and how to change your bag and tubing.    Don t try to remove the catheter by yourself.    You may shower with the catheter in place.  Emptying a leg bag    Wash your hands.    Remove the stopper on the bag.    Drain the bag into the toilet or a measuring container. Don t let the tip of the drainage tube touch anything, including your fingers.    Clean the tip of the drainage tube with alcohol.    Replace the  stopper.  Follow-up care  Make a follow-up appointment as directed by your healthcare provider  When to call your healthcare provider  Call your healthcare provider right away if you have any of the following:    Chills or fever above 100.4 F (38 C)    Leakage around the catheter insertion site    Increased spasms (uncontrollable twitching) in your legs, abdomen, or bladder. Occasional mild spasms are normal.    Burning in the urinary tract, penis, or genital area    Nausea and vomiting    Aching in the lower back    Cloudy or bloody (pink or red) urine, sediment or mucus in the urine, or foul-smelling urine   Date Last Reviewed: 1/1/2017 2000-2017 [employer]. [address] [place] [address], [place] [address]. All rights reserved. This information is not intended as a substitute for professional medical care. Always follow your healthcare professional's instructions.                Discharge Instructions: Caring for Your Leg Bag  You are going home with a urinary catheter and collection device (drainage bag) in place. One type of collection device is called a leg bag. This is a smaller drainage bag that you can wear on your leg to collect urine during the day. The bag can fit under your clothing. You can move around with greater ease when using a leg bag instead of a larger collection bag.  You were shown how to care for your catheter in the hospital. This sheet will help you remember those steps when you are at home.  Home care    Wash your hands thoroughly before and after you care for your catheter or collection device.    Gather your supplies:    Alcohol wipes    Soap and water    Towel and washcloth    Leg strap and leg bag    Use soap and water to wash the area where your catheter enters your body. Rinse well.    Secure the bag bentley to your leg:    Position the leg band high on your thigh with the product label pointing away from your leg.    Stretch the leg band in place and fasten.    Place the  catheter tubing over the bag and secure it. You may secure it with a Velcro tab or other method, depending on the product you use. Be sure to leave enough loop in the catheter above the leg band to avoid pulling on the tube.    Every 4 to 6 hours, reposition the band to prevent pressure from the elastic on your leg. You can do this by changing the bag to the other leg or by raising or lowering the leg band.    Wash the band as frequently as needed. You can hand wash and dry the leg band.    Place the bag in the bag bentley.    Clean the urine bag end of the catheter and your catheter port with an alcohol wipe.    Place a towel under the bag and port to keep urine from dripping onto your leg.    Before connecting the outlet valve at the bottom of the bag to the catheter, make sure that it is firmly closed. Flip the valve upward toward the bag; it needs to snap firmly in place. Be sure not to tug on the tubing. Be gentle.    Attach the urine bag to the end of the catheter; insert the connector snugly into the catheter port. You can avoid dribbling urine by bending the catheter tubing just below the tip and holding it while you disconnect it from the catheter. Be careful to keep the tip clean while connecting the leg bag tubing to the catheter--this keeps germs from getting into the system.    Drain the bag when it is full. To drain the bag, flip the clamp downward. Direct the flexible outlet tube to control the flow of urine. You don t have to disconnect the leg bag from the catheter to empty it. Raise your leg up to the edge of the toilet to reach the leg bag. Then you can empty the bag directly into the toilet. This way, you won t need to bend over, which may be uncomfortable.    Keep the leg bag clean. Rinse daily with equal parts water and vinegar to reduce odor and keep the bag free of germs.    Remember to keep the drainage bag below the level of your bladder for proper drainage.  Follow-up  Make a follow-up  appointment as directed by your healthcare provider.  When to call your healthcare provider  Call your healthcare provider right away if you have any of the following:    Redness, swelling, or warmth around the catheter entry site    Pus draining from your catheter entry site or into the catheter tubing and bag    Blood, clots, or floating debris in the urine    Nausea and vomiting    Shaking chills    Fever above 100.4 F (38 C)    Pain that is not relieved by medicine     Catheter that falls out or is dislodged   Date Last Reviewed: 1/1/2017 2000-2017 [employer]. [address] [place] [address]. All rights reserved. This information is not intended as a substitute for professional medical care. Always follow your healthcare professional's instructions.                Emptying and Cleaning Your Urinary Catheter Bag  You have an indwelling urinary catheter. This drains urine from your bladder into a bag. The bag can be one that is used at the bedside. Or it can be a smaller bag that is strapped to the leg. Follow the numbered steps below to empty and clean a urinary bag.       Drain Clean tube Clean catheter   Step 1. Drain the bag    Wash your hands well with soap and water to prevent infecting the urinary catheter and bag.    If the short drainage tube is inserted into a pocket on the bag, take the drainage tube out of the pocket.    Hold the drainage tube over a toilet or measuring container. Open the valve.    Don t touch the tip of the valve or let it touch the toilet or container.    Wash your hands again.  Step 2. Clean the drainage tube    When the bag is empty, clean the tip of the drainage valve with an alcohol wipe.    Close the valve.    Reinsert the drainage tube into the pocket, if there is one.  Step 3. Clean your skin    Wash your hands well before and after cleaning your skin.    If you have a catheter (such as a Pierson) that enters through the urethra, clean the urethral  area with soap and water 1 time(s) daily as you were taught by your healthcare provider. You should also clean after every bowel movement to prevent infection.    Avoid pulling on the tubing when cleaning so you don t injure the urethra.    Don t apply antibiotic ointment or any other antibacterial product to the urethra.    Don t use lubricant on the urethra.    Don t apply powder to the genital area or to the tubing.    If you have a suprapubic catheter  (one that was surgically placed into the bladder through the lower abdomen), your healthcare provider will tell you how to clean your skin around the catheter.  Step 4. Check and clean the catheter tubing    Check the tubing. If there are kinks, cracks, clogs, or you can t see into the tubing, you ll need to change to new tubing as you were shown by your healthcare provider.    If the current tubing can still be used, wash it with soap and water. Always wash the tubing in the direction away from your body. Avoid pulling on the tubing.    Dry the tubing with a clean washcloth or paper towel.  Step 5. Clean the drainage bag    Clean the drainage bag once every 3 days.    Have a clean backup bag or other drainage device ready.    Follow these steps:    Wash your hands well with soap and water.    Disconnect the bag from the catheter tubing. Connect the tubing to the backup bag or drainage device.    Drain any remaining urine from the bag you just disconnected. Close the drainage valve.    Pour some warm soapy water into the bag. Swish the soap around, being sure to get the corners of the bag.    Open the drainage valve to drain the soap. Close the valve.    Fill the bag with 2 parts vinegar and 3 parts water. Shake the solution a bit and allow it to remain in the bag for 30 minutes.    Drain the vinegar solution and rinse the bag with cold tap water.    Hang the bag to drain and air-dry.  When to call your healthcare provider  Call your healthcare provider right away  if you have any of the following:    Little or no urine flowing into the bag    Urine leaking where the catheter enters the body    Pain, burning, or redness of the area where the catheter enters the body    Bloody urine (a trace of blood is normal)    Cloudy or foul-smelling urine, or sand-like grains in your urine    Pain in your lower back or lower abdomen    Your catheter falls out    Fever of 100.4 F (38 C) or higher, or shaking chills   Date Last Reviewed: 1/1/2017 2000-2017 [employer]. [address] [place], [place] [id number]. All rights reserved. This information is not intended as a substitute for professional medical care. Always follow your healthcare professional's instructions.

## 2017-09-27 NOTE — PROCEDURES
UROLOGY OPERATIVE REPORT    Shorty Beverly  1935, 82 year old    SURGEON  Surgeon(s):  Jean Montoya MD    PREOP DIAGNOSIS  Prostate cancer    POSTOP DIAGNOSIS  Same    PROCEDURE  Procedure(s):  DAVINCI XI PROSTATECTOMY      FINDINGS  Large prostate    ANESTHESIA  General    STAFF  Circulator: Kevin Wooten RN  Relief Circulator: Mildred Armenta RN; Josie Stevens RN  Relief Scrub: Dai Alvarenga; Ruth Hammond  Scrub Person: Helena Ferrer  First Assistant: Satya Clement    COMPLICATIONS  None    SPECIMEN  To path with bladder neck margin taken from the posterior bladder neck.     PROSTHESIS/ GRAFTS/ DEVICES  None    EBL  25cc    TECHNIQUE  The patient was taken to the operating room and placed in the dorsal lithotomy position and his abdomen and genitalia were shaved, prepped and draped in a sterile fashion. A surgical timeout was taken to verify the correct site of surgery as well as the correct patient demographic information. A 2 cm vertical incision was made above the umbilicus and cautery used to identify the external oblique fascia. This was incised and an 0 Vicryl stitch placed through each side of the pinpoint incision. The Veress needle was then passed through this when the fascia was elevated cephalad. Water was placed through the needle and easily drained through without return and then the gas was hooked up and the abdomen distended to 15 cm of water pressure.   The abdomen was viewed and there was no bowel injury and the locations for the additional ports were identified and 3 additional 8 mm ports were placed for the robot arms and then 2 assistant ports were placed as well. These were placed in standard robotic position with blunt trocars used under direct vision. There was no bowel or vascular injury during any of the port placements. Next, attention was placed towards docking the robot after the patient was put in the steep Trendelenburg  position.     There were some sigmoid attachments that needed to be brought down and then the cul-de-sac was identified and the peritoneum incised over this. The vas deferens were seen bilaterally and dissected free and then the seminal vesicles were clipped at their apex and divided from the supporting tissue.     Next, the median umbilical ligaments were divided and dropped dorsally to drop the bladder from the anterior abdominal wall. The space of Retzius was then fully developed and the fat overlying the prostate was all removed. At this point, the dorsal venous complex could be seen along with the pubic ramus. The endorectal fascia was incised bilaterally and the dorsal venous complex secured with 0 Vicryl. The bladder was then divided from the prostate and the seminal vesicles and vas deferens were pulled ventrally. Care was taken to not buttonhole the bladder neck and this was a clean dissection. The pedicles were taken quite close to the prostate in an effort to preserve nerve function for erectile function postoperatively. The pedicles were clipped and divided. The attachments around the urethra were then further sharply dissected and the urethra divided.   Reconstruction was done using a posterior 3-0 barbed suture through the periurethral tissues and bladder neck. Then the anastomosis was performed with 3-0 V lock in a running fashion but with a bolster at the knot. Another anterior supporting suture with 3.0 Vicryl was used to bolster it. Anastomosis was checked for leakage which there was none and the final 18 [place] Pierson was placed with 15 cc in the balloon. The prostate was removed by lengthening the midline incision. This was then closed with 0 Vicryl and anesthetized with 0.25% Marcaine. All incisions were then closed with staples and the Pierson was secured and the patient transferred to PACU in stable condition.       PLAN  Admit overnight and discharge to home tomorrow evening.   Pierson out in  12-16 days with a nursing visit if I'm not available.       Jean Montoya MD

## 2017-09-27 NOTE — IP AVS SNAPSHOT
[address], [address]    [facility] [address]    Phone:  [phone]                                       After Visit Summary   9/27/2017    Shorty Beverly    MRN: [id number]           After Visit Summary Signature Page     I have received my discharge instructions, and my questions have been answered. I have discussed any challenges I see with this plan with the nurse or doctor.    ..........................................................................................................................................  Patient/Patient Representative Signature      ..........................................................................................................................................  Patient Representative Print Name and Relationship to Patient    ..................................................               ................................................  Date                                            Time    ..........................................................................................................................................  Reviewed by Signature/Title    ...................................................              ..............................................  Date                                                            Time

## 2017-09-27 NOTE — ANESTHESIA POSTPROCEDURE EVALUATION
Patient: Shorty Beverly    Procedure(s):  DAVINCI XI PROSTATECTOMY  - Wound Class: II-Clean Contaminated    Diagnosis:PROSTATE CA  Diagnosis Additional Information: No value filed.    Anesthesia Type:  General, ETT    Note:  Anesthesia Post Evaluation    Patient location during evaluation: PACU  Patient participation: Able to fully participate in evaluation  Level of consciousness: sleepy but conscious and responsive to verbal stimuli  Pain management: adequate  Airway patency: patent  Cardiovascular status: acceptable and hemodynamically stable  Respiratory status: acceptable and unassisted  Hydration status: acceptable  PONV: none     Anesthetic complications: None          Last vitals:  Vitals:    09/27/17 1640 09/27/17 1650 09/27/17 1700   BP: 108/66 92/59 108/62   Resp: 16 24 16   Temp:  36.1  C (97  F) 36.1  C (97  F)   SpO2: 100% 99% 99%         Electronically Signed By: Victor Hugo Caban MD  September 27, 2017  5:08 PM

## 2017-09-28 VITALS
SYSTOLIC BLOOD PRESSURE: 110 MMHG | RESPIRATION RATE: 16 BRPM | TEMPERATURE: 98.4 F | DIASTOLIC BLOOD PRESSURE: 65 MMHG | WEIGHT: 161 LBS | HEIGHT: 65 IN | BODY MASS INDEX: 26.82 KG/M2 | OXYGEN SATURATION: 98 %

## 2017-09-28 LAB
COPATH REPORT: NORMAL
GLUCOSE BLDC GLUCOMTR-MCNC: 115 MG/DL (ref 70–99)
HGB BLD-MCNC: 11.3 G/DL (ref 13.3–17.7)

## 2017-09-28 PROCEDURE — 25000132 ZZH RX MED GY IP 250 OP 250 PS 637: Mod: GY | Performed by: UROLOGY

## 2017-09-28 PROCEDURE — 36415 COLL VENOUS BLD VENIPUNCTURE: CPT | Performed by: UROLOGY

## 2017-09-28 PROCEDURE — 25000128 H RX IP 250 OP 636: Performed by: UROLOGY

## 2017-09-28 PROCEDURE — 00000146 ZZHCL STATISTIC GLUCOSE BY METER IP

## 2017-09-28 PROCEDURE — A9270 NON-COVERED ITEM OR SERVICE: HCPCS | Mod: GY | Performed by: UROLOGY

## 2017-09-28 PROCEDURE — 85018 HEMOGLOBIN: CPT | Performed by: UROLOGY

## 2017-09-28 RX ORDER — OXYCODONE HYDROCHLORIDE 5 MG/1
5-10 TABLET ORAL EVERY 4 HOURS PRN
Qty: 20 TABLET | Refills: 0 | Status: SHIPPED | OUTPATIENT
Start: 2017-09-28

## 2017-09-28 RX ADMIN — ACETAMINOPHEN 975 MG: 325 TABLET, FILM COATED ORAL at 13:00

## 2017-09-28 RX ADMIN — SODIUM CHLORIDE: 9 INJECTION, SOLUTION INTRAVENOUS at 06:42

## 2017-09-28 RX ADMIN — GABAPENTIN 100 MG: 100 CAPSULE ORAL at 16:26

## 2017-09-28 RX ADMIN — GABAPENTIN 100 MG: 100 CAPSULE ORAL at 08:22

## 2017-09-28 RX ADMIN — ACETAMINOPHEN 975 MG: 325 TABLET, FILM COATED ORAL at 06:16

## 2017-09-28 RX ADMIN — BACITRACIN ZINC, NEOMYCIN, POLYMYXIN B: 400; 3.5; 5 OINTMENT TOPICAL at 08:24

## 2017-09-28 RX ADMIN — SENNOSIDES AND DOCUSATE SODIUM 1 TABLET: 8.6; 5 TABLET ORAL at 08:22

## 2017-09-28 NOTE — PLAN OF CARE
Problem: Patient Care Overview  Goal: Plan of Care/Patient Progress Review  Outcome: Improving  Alert and oriented x4, forgetful. VSS on room air. Abdominal discomfort controlled with scheduled tylenol and ice pack. Up SBA. Regular diet - tolerating well. Pierson patent - will go home with it. No flatus. IV SL. Encourage PO intake. Pierson catheter teaching done however will need to be reinforced when family is present to pick-up patient. Plan to discharge this evening.

## 2017-09-28 NOTE — PLAN OF CARE
Problem: Patient Care Overview  Goal: Plan of Care/Patient Progress Review  Outcome: Improving  A/o x4. VSS on RA. Overnight, pt stated abd pain is 6/10 when moving and 1/10 while resting, denied any PRN pain meds, ice pack given and effective. 5 lap sites and 1 abd incision, all WDL, scant drainage. Was advanced to full liquid diet last evening after tolerating clears, has not had any fulls yet. No flatus and dave is patent with light pink to yellow output. L PIV infusing NS at 75 ml/hr. Plan for patient to discharge today in the evening with dave. Continue to monitor.

## 2017-09-28 NOTE — PLAN OF CARE
Problem: Patient Care Overview  Goal: Plan of Care/Patient Progress Review  A/o x4. Arrived to floor around 1730. VSS. Denies nausea. 4/10 abd pain relieved with 2mg PO dilaudid. Encouraging IS use, clear LS. Lap sites and abd incision WDL with scant drainage. Tolerating clear liquid diet. No flatus, hypoactive BS. Dave patent with red output. NS infusing @ 75. MRSA precautions. Dangled at bedside. Planning for DC tomorrow evening with dave.

## 2017-09-28 NOTE — PROGRESS NOTES
"Urology POD #1  S/P Robotic assisted radical prostatectomy 9/27/17    Subjective:  Pt found sitting in chair at bedside. Pt reports he's feeling pretty well overall. Pt has minimal pain, hasn't required much pain medicine. Pt has ambulated this morning without problems. Pt is tolerating a regular diet without nausea or vomiting, though hasn't eaten much. Pt feels comfortable going home tonight.       Objective:  /71 (BP Location: Left arm)  Temp 96.8  F (36  C) (Oral)  Resp 16  Ht 1.651 m (5' 5\")  Wt 73 kg (161 lb)  SpO2 100%  BMI 26.79 kg/m2    Intake/Output Summary (Last 24 hours) at 09/28/17 1318  Last data filed at 09/28/17 1314   Gross per 24 hour   Intake             3506 ml   Output             1280 ml   Net             2226 ml     Labs:  ROUTINE IP LABS (Last four results)  BMP  Recent Labs  Lab 09/27/17  1102   POTASSIUM 3.9   CR 0.98     CBC  Recent Labs  Lab 09/28/17  0659   HGB 11.3*     Exam:  Gen: Alert and oriented, cooperative, NAD  Ab:Hypoactive BS, soft, rounded, NTTP, 6 small incisions well-approximated with staples without erythema, edema. NO SEB   : Catheter in place draining pink tinged jessica urine.  Ext: Calves soft, nttp, no edema      Assessment/Plan:  S/P Robotic assisted radical prostatectomy 9/27/17   -PO Tylenol, PO Dilaudid for pain    -Encourage ambulation, IS use   -Regular diet, small portions   -Plan to D/C this evening    Discussed exam, lab work and plan with Dr. Montoya  Please contact me with any questions or concerns.     Isa Cameron PA-C  [facility]  Pager:  [phone]  After 4pm and on weekends, please call [phone]    "

## 2017-09-29 NOTE — PROGRESS NOTES
Patient discharged at 7 PM to home.  IV's were discontinued. Pain at time of discharge was 2/10. Belongings returned to patient.  Discharge instructions and medications reviewed with patient and wife, Pat.  Patient verbalized understanding and all questions were answered. Pierson education complete. Pt discharged home with leg bag and other supplies. Urine color was jessica with small clots noted. Adequate output. Oxycodone given to patient.  At time of discharge, patient condition was stable and left the unit by wheelchair escorted by aide.       Problem: Goal Outcome Summary  Goal: Goal Outcome Summary     SLP; Pt seen for dysphagia tx. Pt had significant, thick, yellow-tinged and dry mucus throughout oral cavity. Needs aggressive oral cares. Recommend initiate full liquid diet. Straws okay. Pt must be alert, recommend assist, sit upright, take small sips/bites and alternate consistencies. SLP to follow.

## 2017-10-07 NOTE — DISCHARGE SUMMARY
Urology Discharge Summary:     Admit date: 9/27/2017  Discharge date: 9/28/2017    Diagnosis: Prostate cancer  Procedure: Robotic assisted radical prostatectomy     Hospital course:  Pt admitted for a planned Robotic assisted radical prostatectomy  with Dr. Montoya for known Prostate cancer.  Pt tolerated the procedure well with minimal blood loss and was transferred to the floor in stable condition; dave in place.  Postoperative analgesia was achieved with IV Dilaudid, pt was transitioned to oral pain medication on POD 1, which was well tolerated.  Pt remained hemodynamically stable throughout postoperative recovery.  Diet was gradually resumed with the return of bowel function which pt tolerated without nausea or vomiting.  Activity was gradually increased until pt resumed independence with transfers / ambulation.    Pt discharged home POD #1 in stable condition, pain well controlled, ambulating well and follow up care arranged. Daev catheter transitioned to leg bag and pt / family instructed on home cares prior to discharge.       Labs:  Recent Labs   Lab Test  09/28/17   0659  10/12/16   0700  11/29/15   1835   03/26/13   1316   WBC   --    --   12.6*   --   5.7   RBC   --    --   4.19*   --   4.19*   HGB  11.3*  9.7*  13.6   < >  12.9*   HCT   --    --   38.7*   --   38.1*   MCV   --    --   92   --   91   MCH   --    --   32.5   --   30.8   MCHC   --    --   35.1   --   33.9   PLT   --    --   173   --   196    < > = values in this interval not displayed.     Recent Labs   Lab Test  09/27/17   1102  11/29/15   1835  03/26/13   1316  01/18/13   0635   POTASSIUM  3.9  4.0  4.5  3.6   CHLORIDE   --   106  103  103   BUN   --   22  20  20     Pathology:  SPECIMEN(S):   A: Bladder neck margin   B: Prostate     FINAL DIAGNOSIS:   A: Bladder neck margin, resection        - Bladder neck tissue without malignancy or pathologic   abnormalities     B. PROSTATE GLAND: Radical Prostatectomy     Specimen        Procedure:  "   Radical prostatectomy        Prostate Size: See gross        Prostate Weight: See gross        Lymph Node Sampling:   No lymph nodes present     Tumor        Histologic Type:   Adenocarcinoma (acinar, not otherwise specified)        Juan C Pattern:             Primary Pattern:   Grade 4             Secondary Pattern:   Grade 3             Total [place] Score: 7     Extent        Tumor Quantitation             Proportion (p%) of prostatic tissue involved by tumor: 30% -   Large bilateral tumor             Tumor Size (dominant nodule, if present):   Greatest   dimension: At least 3 cm - Tumor extends from the prostate apex to the   bladder neck        Extraprostatic Extension:   Not identified        Seminal Vesicle Invasion:   Not identified     Margins:   Margins uninvolved by invasive carcinoma--the left apex   margin shows grade 3 adenocarcinoma involving an inked and cauterized   margin over approximately 1 mm        Focality of Tumor:    Unifocal     Accessory Findings        Treatment Effect on Carcinoma:   Not identified        Lymph-Vascular Invasion:   Not identified        Perineural Invasion:   Present     Pathologic Staging (pTNM)        Primary Tumor (pT):    pT2c:  Bilateral disease             Regional Lymph Nodes (pN):   PNX: Cannot be assessed     COMMENT:     This is a large volume bilateral peripheral zone adenocarcinoma of   prostate which is approximately 85% grade 4 and 15% grade 3.  Grade 3   adenocarcinoma involves an inked and cauterized left apical margin over   approximately 1 mm     Electronically signed out by:     Stu Rios M.D.     GROSS:   A. The specimen is received in formalin with proper patient   identification labeled \"bladder neck margin\".  The specimen consists of   pink rubbery tissue fragment measuring 0.6 x 0.4 x 0.3.  The specimen is   entirely submitted in one cassette.     B. The specimen is received in formalin and labeled\" prostate\" with the   patient's name " and proper identification.  The specimen consists of 61   gram prostate (5.8 cm from right to left, 4.1 cm from anterior to   posterior and 3.8 cm from superior to inferior) with attached right   seminal vesicle (2.7 x 1.6 x 0.7 cm) and left seminal vesicle (3.6 x 1.7   x 0.7 cm).  The specimen is oriented and inked blue for right and black   for left.  The apex and bladder neck are coned.  The remaining specimen   is serially sectioned from inferior to superior.  On section, the   specimen has a pink-tan fibromuscular center throughout.  There is no   evidence of grossly obvious tumor identified.  Representative sections   are submitted in 18 cassettes.     Cassette 1: Right apex   Cassette 2: Left apex   Cassettes 3-5: Right anterior quadrant from inferior to superior   Cassettes 6-8: Right posterior quadrant from inferior to superior   Cassettes 9-11: Left anterior quadrant from inferior to superior   Cassettes 12-14: Left posterior quadrant from inferior to superior   Cassette 15: Right bladder neck cone   Cassette 16: Left bladder neck cone   Cassette 17: Right seminal vesicle   Cassette 18: Left seminal vesicle (Dictated by: Toni Shaw 9/27/2017   04:50 PM)     CPT Codes:   A: 72583-PP4   B: 73105-OQ0     Discharge Orders     Reason for your hospital stay   You were in the hospital to have surgery on your prostate and to recover from surgery     Activity   Your activity upon discharge: Avoid strenuous activity, including lifting over 15 pounds or straining for a bowel movement, for the next month while you are healing from surgery. No driving while a catheter is in place or if you are taking narcotic pain meds (Percocet, Norco, Oxycodone, Hydrocodone, etc.)     Discharge Instructions   -You have a dave catheter in place to allow for healing of your urinary system.   You will need to see your urology doctor to have the catheter checked and subsequently removed at your outpatient follow up appointment.  -Do  not soak or take a tub bath (no swimming or going in a hot tub) while your catheter remains in place. You may shower with the catheter in. You may use a wet or soapy wash cloth to clean the outside of the catheter and the groin area as needed for hygiene, just make sure to avoid pulling on the catheter. Always secure the catheter to your thigh to prevent pulling.  -Look at the appearance and amount of drainage that comes from the catheter into the bag. Call the [facility] office if the drain falls out, the drainage has bright red blood that does not clear within 48 hours, the drainage has a foul smell, the drainage has pus in it or you have any other concerns.  -For male patients: You may apply a small amount of bacitracin/neosporin ointment to the tip of your penis 2-4 times daily while your catheter is in place to help reduce irritation.    Your incision was closed using surgical staples that will be removed at your follow up appointment. You can leave the stapled incision open to air 24 hours after surgery. You should remove the bandages over your incisions within 48 hours after surgery (if they're left on longer, the bonding of the adhesive to your skin may become firmly attached and remove skin when they're removed, causing a painful sore). There are no specific cares needed aside from keeping the area clean and dry, but you can use an over the counter ointment such as bacitracin or neosporin on the incisions if you prefer. You may shower normally, but avoid scrubbing that area or using harsh soaps. Pat dry after shower. Do not soak in the tub, swim, or otherwise submerge your surgical site in water until your staples have been removed and your incision is well healed.    The SEB drain site will ooze for the first 24-48 hours after the drain is removed. You should keep the dressing over the site until it scabs over and you should keep the area dry during this time. After 24-48 hours, you can remove the  dressing and leave it open to air. After 24-48 hours, you can get the site wet, but you should avoid submerging it in water (no tub baths, no swimming, etc) until it is well-healed (2-4 weeks).     You should expect some gas and bloating discomfort in your abdomen after surgery, particularly for the first week. This is normal. To help manage this, you should eat small light meals, take frequent short walks, and use as little narcotic pain medicine as you can to maintain a comfortable pain level,    You may resume your over the counter aspirin, NSAIDS (ibuprofen, naproxen, aleve, etc.), and vitamins/dietary supplements 24 hours after your catheter has been removed.    You are being prescribed a narcotic pain medication. This is a strong pain medication that should be used to keep your pain to a tolerable level, but no more than necessary as it can be addictive and has side effects. With the narcotic pain medicine, you might experience some constipation, so you may want to take an over the counter stool softener like dulcolax or a fiber supplement such as Metamucil or Benefiber to keep your bowel movements soft and regular. You should also be very careful with driving, as the pain medicine may dull your senses and reflexes. Avoid alcohol, as this can unexpectedly increase the effect of the pain medicine.    If there are any problems that concern you enough to visit an emergency room in the next month or two, please return to the hospital you had surgery at, as the staff at that hospital will be familiar with you, your procedure, and your surgeon as well as have access to all of your records to make sure your surgical site is healing well.     Follow Up   Please follow up with our office nurse on Wednesday, 10/11/17 at 9:30am for a routine post-hospital check and catheter removal    [facility]  [address], [address]  [place]. [address]    You may call [phone] with any questions or  concerns or if you need to cancel/reschedule appointment.     Follow Up   Please follow up with our office nurse on Friday, 10/6/17 at 1pm for a routine post-hospital check and staple removal    [facility]  [address], [address]  [place]. [address]    You may call [phone] with any questions or concerns or if you need to cancel/reschedule appointment.     Diet   Be sure to keep yourself well hydrated by drinking plenty of fluids & eat smaller, more frequent meals while you are healing.Try to eat more healthy foods & avoid junk food.       Discharge Medications   Discharge Medication List as of 9/28/2017  5:33 PM      START taking these medications    Details   oxyCODONE (ROXICODONE) 5 MG IR tablet Take 1-2 tablets (5-10 mg) by mouth every 4 hours as needed for moderate to severe pain, Disp-20 tablet, R-0, Local Print         CONTINUE these medications which have NOT CHANGED    Details   GLUCOSAMINE-CHONDROITIN PO Take 1 tablet by mouth daily 1500mg/1200mg, Historical      Cyanocobalamin (VITAMIN B 12 PO) Take 2,500 mcg by mouth daily, Historical      Acetaminophen (TYLENOL PO) Take 500-1,000 mg by mouth daily , Historical      Cholecalciferol (VITAMIN D3 PO) Take 5,000 Units by mouth daily , Historical      multivitamin, therapeutic with minerals (MULTI-VITAMIN) TABS Take 1 tablet by mouth daily., Historical         STOP taking these medications       TAMSULOSIN HCL PO Comments:   Reason for Stopping:             Isa Cameron PA-C   Physician Assistant for [facility]

## 2018-10-07 ENCOUNTER — APPOINTMENT (OUTPATIENT)
Dept: CT IMAGING | Facility: CLINIC | Age: 83
End: 2018-10-07
Attending: PHYSICIAN ASSISTANT
Payer: MEDICARE

## 2018-10-07 ENCOUNTER — HOSPITAL ENCOUNTER (EMERGENCY)
Facility: CLINIC | Age: 83
Discharge: HOME | End: 2018-10-08
Attending: EMERGENCY MEDICINE | Admitting: EMERGENCY MEDICINE
Payer: MEDICARE

## 2018-10-07 ENCOUNTER — APPOINTMENT (OUTPATIENT)
Dept: GENERAL RADIOLOGY | Facility: CLINIC | Age: 83
End: 2018-10-07
Attending: PHYSICIAN ASSISTANT
Payer: MEDICARE

## 2018-10-07 VITALS
BODY MASS INDEX: 25.33 KG/M2 | SYSTOLIC BLOOD PRESSURE: 140 MMHG | HEIGHT: 65 IN | TEMPERATURE: 97.7 F | RESPIRATION RATE: 16 BRPM | DIASTOLIC BLOOD PRESSURE: 73 MMHG | WEIGHT: 152 LBS | OXYGEN SATURATION: 97 %

## 2018-10-07 DIAGNOSIS — M25.512 ACUTE PAIN OF LEFT SHOULDER: ICD-10-CM

## 2018-10-07 DIAGNOSIS — M79.672 PAIN OF LEFT HEEL: ICD-10-CM

## 2018-10-07 DIAGNOSIS — S00.91XA ABRASION OF HEAD, INITIAL ENCOUNTER: ICD-10-CM

## 2018-10-07 DIAGNOSIS — S09.90XA CLOSED HEAD INJURY, INITIAL ENCOUNTER: Primary | ICD-10-CM

## 2018-10-07 PROCEDURE — 73030 X-RAY EXAM OF SHOULDER: CPT | Mod: LT

## 2018-10-07 PROCEDURE — 99285 EMERGENCY DEPT VISIT HI MDM: CPT | Mod: 25

## 2018-10-07 PROCEDURE — 70450 CT HEAD/BRAIN W/O DYE: CPT

## 2018-10-07 ASSESSMENT — ENCOUNTER SYMPTOMS
ARTHRALGIAS: 1
SHORTNESS OF BREATH: 0
MYALGIAS: 1
ABDOMINAL PAIN: 0

## 2018-10-07 NOTE — ED AVS SNAPSHOT
Emergency Department    [address] [facility] [address]    Phone:  [phone]    Fax:  [phone]                                       Shorty Beverly   MRN: [id number]    Department:   Emergency Department   Date of Visit:  10/7/2018           After Visit Summary Signature Page     I have received my discharge instructions, and my questions have been answered. I have discussed any challenges I see with this plan with the nurse or doctor.    ..........................................................................................................................................  Patient/Patient Representative Signature      ..........................................................................................................................................  Patient Representative Print Name and Relationship to Patient    ..................................................               ................................................  Date                                   Time    ..........................................................................................................................................  Reviewed by Signature/Title    ...................................................              ..............................................  Date                                               Time          22EPIC Rev 08/18

## 2018-10-07 NOTE — ED AVS SNAPSHOT
Emergency Department    [address] [facility] [address]    Phone:  [phone]    Fax:  [phone]                                       Shorty Beverly   MRN: [id number]    Department:   Emergency Department   Date of Visit:  10/7/2018           Patient Information     Date Of Birth          1935        Your diagnoses for this visit were:     Fall, initial encounter     Acute pain of left shoulder     Pain of left heel     Abrasion of head, initial encounter        You were seen by Trierweiler, Chad A, MD.      Follow-up Information     Follow up with Christian Escobar MD.    Specialty:  Family Practice    Why:  As needed    Contact information:    [place]  [address]  [facility] [address] [phone]          Follow up with  Emergency Department.    Specialty:  EMERGENCY MEDICINE    Why:  If symptoms worsen    Contact information:    [address] [facility] [address] [phone]        Discharge Instructions         Arthralgia    Arthralgia is the term for pain in or around the joint. It is a symptom, not a disease. This pain may involve one or more joints. In some cases, the pain moves from joint to joint.  There are many causes for joint pain. These include:    Injury    Osteoarthritis (wearing out of the joint surface)    Gout (inflammation of the joint due to crystals in the joint fluid)    Infection inside the joint      Bursitis (inflammation of the fluid-filled sacs around the joint)    Autoimmune disorders such as rheumatoid arthritis or lupus    Tendonitis (inflammation of chords that attach muscle to bone)  Home care    Rest the involved joint(s) until your symptoms improve.     You may be prescribed pain medicine. If none is prescribed, you may use acetaminophen or ibuprofen to control pain and inflammation.  Follow-up care  Follow up with your healthcare provider or as advised.  When to seek medical advice  Contact your  healthcare provider right away if any of the following occurs:    Pain, swelling, or redness of joint increases    Pain worsens or recurs after a period of improvement    Pain moves to other joints    You cannot bear weight on the affected joint     You cannot move the affected joint    Joint appears deformed    New rash appears    Fever of 100.4 F (38 C) or higher, or as directed by your healthcare provider  Date Last Reviewed: 3/1/2017    4233-3232 [employer]. [address] [place] [address], [place] [address]. All rights reserved. This information is not intended as a substitute for professional medical care. Always follow your healthcare professional's instructions.          24 Hour Appointment Hotline       To make an appointment at any [facility], call [phone] ([phone]). If you don't have a family doctor or clinic, we will help you find one. [place] clinics are conveniently located to serve the needs of you and your family.             Review of your medicines      Our records show that you are taking the medicines listed below. If these are incorrect, please call your family doctor or clinic.        Dose / Directions Last dose taken    GLUCOSAMINE-CHONDROITIN PO   Dose:  1 tablet        Take 1 tablet by mouth daily 1500mg/1200mg   Refills:  0        Multi-vitamin Tabs tablet   Dose:  1 tablet        Take 1 tablet by mouth daily.   Refills:  0        oxyCODONE IR 5 MG tablet   Commonly known as:  ROXICODONE   Dose:  5-10 mg   Quantity:  20 tablet        Take 1-2 tablets (5-10 mg) by mouth every 4 hours as needed for moderate to severe pain   Refills:  0        TYLENOL PO   Dose:  500-1000 mg        Take 500-1,000 mg by mouth daily   Refills:  0        VITAMIN B 12 PO   Dose:  2500 mcg        Take 2,500 mcg by mouth daily   Refills:  0        VITAMIN D3 PO   Dose:  5000 Units        Take 5,000 Units by mouth daily   Refills:  0                Procedures and tests performed during your  visit     Head CT w/o contrast    XR Calcaneus Left G/E 2 Views    XR Shoulder Left G/E 3 Views      Orders Needing Specimen Collection     None      Pending Results     Date and Time Order Name Status Description    10/7/2018 2336 XR Shoulder Left G/E 3 Views Preliminary     10/7/2018 2336 Head CT w/o contrast Preliminary     10/7/2018 2336 XR Calcaneus Left G/E 2 Views Preliminary             Pending Culture Results     No orders found for last 3 day(s).            Pending Results Instructions     If you had any lab results that were not finalized at the time of your Discharge, you can call the ED Lab Result RN at [phone]. You will be contacted by this team for any positive Lab results or changes in treatment. The nurses are available 7 days a week from 10A to 6:30P.  You can leave a message 24 hours per day and they will return your call.        Test Results From Your Hospital Stay              10/8/2018 12:20 AM      Narrative     XR CALCANEUS LEFT GREATER THAN 2 VIEWS   10/8/2018 12:10 AM     INDICATION: Fall, heel pain.    COMPARISON: None.        Impression     IMPRESSION: No acute fracture. Small plantar calcaneal spur.         10/8/2018 12:07 AM      Narrative     CT HEAD WITHOUT CONTRAST  10/8/2018 12:00 AM     HISTORY: Fall with head strike.     TECHNIQUE:  Axial images of the head and coronal reformations without  IV contrast material. Radiation dose for this scan was reduced using  automated exposure control, adjustment of the mA and/or kV according  to patient size, or iterative reconstruction technique.    COMPARISON: None.    FINDINGS: No intracranial hemorrhage, mass or mass effect.  No acute  infarct identified. No shift of midline structures. The ventricles are  symmetric. No skull fractures. Left frontal sinus is opacified. Mild  mucosal thickening within several ethmoid air cells.        Impression     IMPRESSION: No acute intracranial abnormality.         10/8/2018 12:20 AM      Narrative      XR SHOULDER LEFT GREATER THAN 3 VIEWS   10/8/2018 12:09 AM     INDICATION: Fall, left shoulder pain.    COMPARISON: None.        Impression     IMPRESSION: No acute fracture or dislocation. Degenerative changes of  left shoulder and AC joint. Old left rib fractures.                Clinical Quality Measure: Blood Pressure Screening     Your blood pressure was checked while you were in the emergency department today. The last reading we obtained was  BP: 140/73 . Please read the guidelines below about what these numbers mean and what you should do about them.  If your systolic blood pressure (the top number) is less than 120 and your diastolic blood pressure (the bottom number) is less than 80, then your blood pressure is normal. There is nothing more that you need to do about it.  If your systolic blood pressure (the top number) is 120-139 or your diastolic blood pressure (the bottom number) is 80-89, your blood pressure may be higher than it should be. You should have your blood pressure rechecked within a year by a primary care provider.  If your systolic blood pressure (the top number) is 140 or greater or your diastolic blood pressure (the bottom number) is 90 or greater, you may have high blood pressure. High blood pressure is treatable, but if left untreated over time it can put you at risk for heart attack, stroke, or kidney failure. You should have your blood pressure rechecked by a primary care provider within the next 4 weeks.  If your provider in the emergency department today gave you specific instructions to follow-up with your doctor or provider even sooner than that, you should follow that instruction and not wait for up to 4 weeks for your follow-up visit.        Thank you for choosing [facility]       Thank you for choosing [facility] for your care. Our goal is always to provide you with excellent care. Hearing back from our patients is one way we can continue to improve our services. Please take a  "few minutes to complete the written survey that you may receive in the mail after you visit with us. Thank you!        [employer] Information     [employer] lets you send messages to your doctor, view your test results, renew your prescriptions, schedule appointments and more. To sign up, go to [email or web address] . Click on \"Log in\" on the left side of the screen, which will take you to the Welcome page. Then click on \"Sign up Now\" on the right side of the page.     You will be asked to enter the access code listed below, as well as some personal information. Please follow the directions to create your username and password.     Your access code is: [email or web address]  Expires: 2019 12:43 AM     Your access code will  in 90 days. If you need help or a new code, please call your [place] clinic or [phone].        Care EveryWhere ID     This is your Care EveryWhere ID. This could be used by other organizations to access your [place] medical records  [id number]        Equal Access to Services     [facility]: Hadii lalitha Logan, waaxda luqadaha, qaybta kaalmavanessa delacruz, sommer golden . [facility] [phone].    ATENCIÓN: Si habla español, tiene a warren disposición servicios gratuitos de asistencia lingüística. Llame al [phone].    We comply with applicable federal civil rights laws and [place] laws. We do not discriminate on the basis of race, color, national origin, age, disability, sex, sexual orientation, or gender identity.            After Visit Summary       This is your record. Keep this with you and show to your community pharmacist(s) and doctor(s) at your next visit.                  "

## 2018-10-08 ENCOUNTER — APPOINTMENT (OUTPATIENT)
Dept: GENERAL RADIOLOGY | Facility: CLINIC | Age: 83
End: 2018-10-08
Attending: PHYSICIAN ASSISTANT
Payer: MEDICARE

## 2018-10-08 PROCEDURE — 73650 X-RAY EXAM OF HEEL: CPT | Mod: LT

## 2018-10-08 NOTE — ED PROVIDER NOTES
History     Chief Complaint:    Fall     HPI   Shorty Beverly is a 83 year old male with a history of hypertension, hyperlipidemia, prostate cancer, and tremors who presents to the ED for evaluation of left shoulder pain after a fall. The patient states that he has taking out the garbage tonight when fell. He does not remember the exact mechanism of his fall but is complaining of left shoulder pain, left heel pain, and hitting his head. He denies loss of consciousness, chest pain, dizziness, shortness of breath, abdominal pain, or headache. The patient states that his left shoulder hurts with movement but is almost pain free at rest. Additionally, he states that her has required the assistance of a walker to ambulate following the fall because his left heel hurts with weight bearing. The patient states that he has a history of dementia, however, his son states that while he does occassionally get confused he has no real medical history.    Allergies:  Demerol  Penicillins  Valium     Medications:    The patient is currently on no regular medications.     Past Medical History:    Anemia  Anxiety  BPH  Hyperlipidemia  HTN  Infected prosthetic knee joint  Spinal stenosis  Tremor  Prostate cancer    Past Surgical History:    Arthroplasty revision knee (x2)  Colonoscopy  Davinci prostatectomy  Decompression, fusion lumbar posterior three + levels  T and A (x2)   surgery  Hemorrhoidectomy  Right hernia repair  Incision limbal relaxing, keratotomy arcuate  Phacoemulsification clear cornea with standard intraocular lens implant (x2)    Family History:    No past pertinent family history.    Social History:  Former smoker, quit 6/22/1992.  Positive for alcohol use.   Marital Status:   [2]     Review of Systems   Respiratory: Negative for shortness of breath.    Cardiovascular: Negative for chest pain.   Gastrointestinal: Negative for abdominal pain.   Musculoskeletal: Positive for arthralgias and myalgias.  "  Neurological: Negative for syncope.   All other systems reviewed and are negative.      Physical Exam   First Vitals:  BP: 140/73  Heart Rate: 62  Temp: 97.7  F (36.5  C)  Resp: 16  Height: 165.1 cm (5' 5\")  Weight: 68.9 kg (152 lb)  SpO2: 97 %      Physical Exam    General: Well-nourished, appears stated age  Eyes: PERRL, conjunctivae pink no scleral icterus or conjunctival injection  ENT:  Moist mucus membranes, pharynx nonerythematous and without exudate  Respiratory:  No respiratory distress, no wheezes, crackles or rales  CV: Normal rate, no murmurs, rubs or gallops  GI: Nontender, nondistended, normal bowel sounds, no rebound or guarding  : No CVA tenderness  Skin: Small abrasion over left brow, Warm, dry.  No rashes or petechiae  Musculoskeletal: No peripheral edema or calf tenderness, strength 5 out of 5 throughout   Left shoulder: No obvious deformity, contusion or laceration. full passive ROM, limited active ROM due to pain in the deltoid. CMS intact  strength 5/5.    Left Heel: No obvious deformity, contusion or laceration, full ROM, able to bare weight normally.   Neuro: GCS 15, alert and oriented to person/place/time, cranial nerves II through XII intact  Psychiatric: Normal affect      Emergency Department Course   Imaging:  Radiographic findings were communicated with the patient who voiced understanding of the findings.  CT Head without contrast:   No acute intracranial abnormality as per radiology.    XR Shoulder left G/E, 2 views:    No acute fracture or dislocation. Degenerative changes of  left shoulder and AC joint. Old left rib fractures as per radiology.     XR Calcaneus, left, 2 views:   No acute fracture. Small plantar calcaneal spur as per radiology.      Emergency Department Course:  Nursing notes and vitals reviewed. (9922) I performed an exam of the patient as documented above.     The patient was sent for a Shoulder XR, calcaneus XR, and head CT while in the emergency " department, findings above.     0022 Dr. Velásquez rechecked the patient and discussed the results of his workup thus far.     Findings and plan explained to the Patient. Patient discharged home with instructions regarding supportive care, medications, and reasons to return. The importance of close follow-up was reviewed.     I personally reviewed the imaging results with the Patient and answered all related questions prior to discharge.       Impression & Plan    Medical Decision Making:    Shorty Beverly is a 83 year old male who presents for evaluation of a fall.  This was clearly a mechanical fall, not syncope.  There were no prodromal symptoms so I doubt stroke, cardiac arrhythmia or other serious etiology.  Detailed exam shows small abrasion over left brow.  Based on this I did not do basic blood work and urinalysis.  Patient was sent for a CT, left shoulder x-ray and left calcaneal x-ray.  All of his imaging was unremarkable and showed no acute pathology, fracture or dislocation.  The patient was offered pain medications but declined.  He has a small abrasion over his left brow the patient declined any wound cleaning or bandage at this time.  I had the tech ambulate the patient and he did well.  Based on this I would send home for outpatient management.  I would not do workup for syncope, stroke, ACS, PE at this point.  I doubt serious underlying fractures, intracerebral issues, spinal fractures.          Diagnosis:    ICD-10-CM    1. Fall, initial encounter W19.XXXA    2. Acute pain of left shoulder M25.512    3. Pain of left heel M79.672    4. Abrasion of head, initial encounter S00.91XA        Disposition:  discharged to home      Scribe Disclosure:  I,  Alverto Rankin, am serving as a scribe on 10/7/2018 at 11:23 PM to personally document services performed by Trierweiler, Chad A, MD based on my observations and the provider's statements to me.        Alverto Rankin  10/7/2018    EMERGENCY  DEPARTMENT       Nathanael Gutierrez PA-C  10/08/18 0045

## 2018-10-08 NOTE — DISCHARGE INSTRUCTIONS

## 2021-01-02 ENCOUNTER — HOSPITAL ENCOUNTER (EMERGENCY)
Facility: CLINIC | Age: 86
Discharge: HOME | End: 2021-01-02
Attending: PHYSICIAN ASSISTANT | Admitting: PHYSICIAN ASSISTANT
Payer: MEDICARE

## 2021-01-02 ENCOUNTER — APPOINTMENT (OUTPATIENT)
Dept: CT IMAGING | Facility: CLINIC | Age: 86
End: 2021-01-02
Attending: PHYSICIAN ASSISTANT
Payer: MEDICARE

## 2021-01-02 VITALS
TEMPERATURE: 97.2 F | RESPIRATION RATE: 16 BRPM | DIASTOLIC BLOOD PRESSURE: 84 MMHG | SYSTOLIC BLOOD PRESSURE: 142 MMHG | OXYGEN SATURATION: 98 % | HEART RATE: 71 BPM

## 2021-01-02 DIAGNOSIS — S09.90XA CLOSED HEAD INJURY, INITIAL ENCOUNTER: ICD-10-CM
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
## 2021-01-02 PROCEDURE — 99284 EMERGENCY DEPT VISIT MOD MDM: CPT | Mod: 25

## 2021-01-02 PROCEDURE — 70450 CT HEAD/BRAIN W/O DYE: CPT | Mod: MG

## 2021-01-02 ASSESSMENT — ENCOUNTER SYMPTOMS
SHORTNESS OF BREATH: 0
NECK PAIN: 0
ARTHRALGIAS: 0
HEADACHES: 0
BACK PAIN: 0

## 2021-01-02 NOTE — ED PROVIDER NOTES
History     Chief Complaint:  Fall        The history is provided by the patient.     Shorty Beverly is a 85 year old male not currently anticoagulated with a history of memory loss, anemia, and hypertension who presents for evaluation after a mechanical fall. The patient reports that he went for a walk this afternoon and believes he slipped on a patch of ice, falling backwards and hitting his head. He does not believe he experienced any loss of consciousness. He was able stand up on his own and a good [employer] drove him back home. He was able to ambulate inside of his house without difficulty. When he arrived home he called his son, Andrea ([phone]), with his wife. Ultimately, Andrea was concerned about the patient's fall so encouraged him to present today. The patient shares that he otherwise would not have presented if it weren't for his son's concern. Here, he denies any headache, shortness of breath, neck pain, chest pain, hip pain, back pain, shoulder pain, elbow pain, lower extremity pain, vision changes, or any other medical concerns. He denied nausea/vomiting or seizure activity follow the fall.     Review of Systems   Eyes: Negative for visual disturbance.   Respiratory: Negative for shortness of breath.    Cardiovascular: Negative for chest pain.   Musculoskeletal: Negative for arthralgias, back pain and neck pain.   Neurological: Negative for headaches.        Negative for loss of consciousness   All other systems reviewed and are negative.    Allergies:  Demerol   Penicillins  Valium      Medications:   Memantine  Donepezil     Medical History:   Anemia  Anxiety   Osteoarthritis   Hyperlipidemia   Degenerative Joint disease   Hypertension   Prostate hypertrophy  Tremor  Spinal stenosis   Benign prostatic hyperplasia  Memory loss   Prostate cancer     Surgical History:  Total knee arthroplasty - bilateral   Arthroplasty revision knee - left x2  Colonoscopy   DaVinci prostatectomy    Decompression, fusion lumbar posterior three+ levels, combined  Tonsillectomy & Adenoidectomy   Eye surgery    surgery   Hemorrhoidectomy   Trigger finger release x2   Hernia repair - right  Incision limbal relaxing, keratotomy arcuate   Phacoemulsification with standard IOL - bilateral     Family History:   Father -  MI, prostate cancer  Mother -  Heart disease     Social History:  The patient was unaccompanied to the ED.  Marital Status:   PCP: Christian Escobar    He lives at home with his wife.       Physical Exam     Patient Vitals for the past 24 hrs:   BP Temp Temp src SpO2   01/02/21 1515 (!) 142/84 97.2  F (36.2  C) Temporal 98 %          Physical Exam  Vitals signs and nursing note reviewed.   HENT:      Nose: Nose normal. No congestion or rhinorrhea. No septal hematoma.      Mouth/Throat:      Mouth: Mucous membranes are moist.   Ears: No hemotympanum.   Neck:  No midline bony tenderness. Normal ROM. No pain with axial loading of head.   Eyes:      General: No scleral icterus.     Extraocular Movements: Extraocular movements intact.      Conjunctiva/sclera: Conjunctivae normal.      Pupils: Pupils are equal, round, and reactive to light.   Cardiovascular:      Rate and Rhythm: Regular rhythm. Normal Rate.     Pulses: Normal pulses.      Heart sounds: Normal heart sounds.   Pulmonary:      Effort: Pulmonary effort is normal.      Breath sounds: Normal breath sounds.   Abdominal:      General: Abdomen is flat. Bowel sounds are normal.      Palpations: Abdomen is soft.      Tenderness: There is no abdominal tenderness.   Musculoskeletal: Normal ROM bilateral upper extremities. Normal ROM right LE. Left LE limited ROM secondary to prior arthroplasty revision knee x2. Unchanged from fall.      Right lower leg: No edema.      Left lower leg: No edema.   Skin:     General: Skin is warm and dry.   Neurological:      Mental Status: He is alert and oriented to person, place, and time. Responds  appropriately to questions. CN II-XII intact. Finger to nose normal. Observed ambulating without difficulty. Strength upper and lower extremities 5/5 bilaterally.   Psychiatric:         Mood and Affect: Mood normal.         Behavior: Behavior normal.     Emergency Department Course     Imaging:    CT Head w/o Contrast:  IMPRESSION: No acute intracranial abnormality.  Reading per radiology.     Emergency Department Course:    Reviewed:  1505 I reviewed the patient's nursing notes, vitals, past medical records, Care Everywhere.     Assessments:  1509 I performed an exam of the patient, as documented above.   1519 I spoke with the patient's son, Andrea, to obtain additional history and share about the patient's presentation, findings, and plan of care.   1605 Patient rechecked and updated following CT results.   1648 I called the patient's son, Andrea, to update him on the patient's CT results.   1649 I spoke with the patient about discharge and return precautions.     Disposition:  The patient was discharged to home.     Findings and plan explained to the Patient. Patient discharged home with instructions regarding supportive care, medications, and reasons to return. The importance of close follow-up was reviewed.      Impression & Plan     Medical Decision Making:  Shorty Beverly is a 85 year old male who presents following a fall as outlined in the HPI. Vitals were reviewed. This was clearly a mechanical fall, not syncope.  There were no prodromal symptoms including chest pain, SOB, severe headache, or confusion so I doubt stroke, cardiac arrhythmia or other serious etiology.  Detailed head to toe exam was unremarkable. Based on this I did not do basic blood work however given age and mechanism of fall, a CT scan was performed which was negative for acute intracranial abnormality.  Patient observed ambulating in the ED without difficulty.  Based on above findings, I feel safe discharging the patient home with  outpatient management.  I would not do workup for syncope, stroke, ACS, PE at this point. The patient was discharged home in stable condition. He was advised to follow up with his PCP in 1-2 days for reassessment. Strict return precautions were discussed. All questions and concerns were addressed prior to discharge. Above was discussed with the patient's son, Andrea.     Diagnosis:     ICD-10-CM    1. Fall  W19.XXXA    2. Closed head injury, initial encounter  S09.90XA         Disposition:  Discharged to home.     Scribe Disclosure:  I, Negar Moore, am serving as a scribe at 3:16 PM on 1/2/2021 to document services personally performed by Gabriela King PA-C based on my observations and the provider's statements to me.        Gabriela King PA-C  01/02/21 1104

## 2021-01-02 NOTE — ED TRIAGE NOTES
Fell on the ice, hit head, and possible LOC.  Not on blood thinners.  Not acting like himself, per son.

## 2021-01-02 NOTE — ED TRIAGE NOTES
Fell taking a walk on sidewalk hit head, denies thinners, denies LOC, son was not with him but thinks he is not at his baseline, no neck pain, no vomiting, son states pt not recalling events of fall, pt disagrees , ambulatory here

## 2021-01-02 NOTE — DISCHARGE INSTRUCTIONS
Please review attached instructions. Follow up with your primary care provider in 1-2 days for reassessment. Return to the ED if you develop severe headache, vomiting, seizure activity, confusion, or any other medical concerns.

## 2021-01-02 NOTE — ED AVS SNAPSHOT
[facility] Emergency Dept  [address] [address]  Phone: [phone]  Fax: [phone]                                    Shorty Beverly   MRN: [id number]    Department: [facility] Emergency Dept   Date of Visit: 1/2/2021           After Visit Summary Signature Page    I have received my discharge instructions, and my questions have been answered. I have discussed any challenges I see with this plan with the nurse or doctor.    ..........................................................................................................................................  Patient/Patient Representative Signature      ..........................................................................................................................................  Patient Representative Print Name and Relationship to Patient    ..................................................               ................................................  Date                                   Time    ..........................................................................................................................................  Reviewed by Signature/Title    ...................................................              ..............................................  Date                                               Time          22EPIC Rev 08/18

## 2021-01-14 ENCOUNTER — HOSPITAL ENCOUNTER (EMERGENCY)
Facility: CLINIC | Age: 86
Discharge: HOME | End: 2021-01-14
Attending: EMERGENCY MEDICINE | Admitting: EMERGENCY MEDICINE
Payer: MEDICARE

## 2021-01-14 VITALS
HEART RATE: 70 BPM | SYSTOLIC BLOOD PRESSURE: 142 MMHG | TEMPERATURE: 96.4 F | RESPIRATION RATE: 18 BRPM | OXYGEN SATURATION: 95 % | DIASTOLIC BLOOD PRESSURE: 70 MMHG

## 2021-01-14 DIAGNOSIS — R42 LIGHTHEADEDNESS: ICD-10-CM

## 2021-01-14 LAB
ANION GAP SERPL CALCULATED.3IONS-SCNC: 5 MMOL/L (ref 3–14)
BASOPHILS # BLD AUTO: 0.1 10E9/L (ref 0–0.2)
BASOPHILS NFR BLD AUTO: 1.2 %
BUN SERPL-MCNC: 24 MG/DL (ref 7–30)
CALCIUM SERPL-MCNC: 8.6 MG/DL (ref 8.5–10.1)
CHLORIDE SERPL-SCNC: 111 MMOL/L (ref 94–109)
CO2 SERPL-SCNC: 24 MMOL/L (ref 20–32)
CREAT SERPL-MCNC: 0.96 MG/DL (ref 0.66–1.25)
DIFFERENTIAL METHOD BLD: ABNORMAL
EOSINOPHIL # BLD AUTO: 0.3 10E9/L (ref 0–0.7)
EOSINOPHIL NFR BLD AUTO: 6.3 %
ERYTHROCYTE [DISTWIDTH] IN BLOOD BY AUTOMATED COUNT: 13.1 % (ref 10–15)
GFR SERPL CREATININE-BSD FRML MDRD: 72 ML/MIN/{1.73_M2}
GLUCOSE SERPL-MCNC: 86 MG/DL (ref 70–99)
HCT VFR BLD AUTO: 37.4 % (ref 40–53)
HGB BLD-MCNC: 12.3 G/DL (ref 13.3–17.7)
IMM GRANULOCYTES # BLD: 0 10E9/L (ref 0–0.4)
IMM GRANULOCYTES NFR BLD: 0.2 %
INTERPRETATION ECG - MUSE: NORMAL
LYMPHOCYTES # BLD AUTO: 1.1 10E9/L (ref 0.8–5.3)
LYMPHOCYTES NFR BLD AUTO: 25.6 %
MCH RBC QN AUTO: 30.9 PG (ref 26.5–33)
MCHC RBC AUTO-ENTMCNC: 32.9 G/DL (ref 31.5–36.5)
MCV RBC AUTO: 94 FL (ref 78–100)
MONOCYTES # BLD AUTO: 0.5 10E9/L (ref 0–1.3)
MONOCYTES NFR BLD AUTO: 11.7 %
NEUTROPHILS # BLD AUTO: 2.3 10E9/L (ref 1.6–8.3)
NEUTROPHILS NFR BLD AUTO: 55 %
NRBC # BLD AUTO: 0 10*3/UL
NRBC BLD AUTO-RTO: 0 /100
PLATELET # BLD AUTO: 209 10E9/L (ref 150–450)
POTASSIUM SERPL-SCNC: 3.7 MMOL/L (ref 3.4–5.3)
RBC # BLD AUTO: 3.98 10E12/L (ref 4.4–5.9)
SODIUM SERPL-SCNC: 140 MMOL/L (ref 133–144)
TROPONIN I SERPL-MCNC: <0.015 UG/L (ref 0–0.04)
WBC # BLD AUTO: 4.1 10E9/L (ref 4–11)

## 2021-01-14 PROCEDURE — 84484 ASSAY OF TROPONIN QUANT: CPT | Performed by: EMERGENCY MEDICINE

## 2021-01-14 PROCEDURE — 99284 EMERGENCY DEPT VISIT MOD MDM: CPT | Mod: 25

## 2021-01-14 PROCEDURE — 258N000003 HC RX IP 258 OP 636: Performed by: EMERGENCY MEDICINE

## 2021-01-14 PROCEDURE — 96360 HYDRATION IV INFUSION INIT: CPT

## 2021-01-14 PROCEDURE — 85025 COMPLETE CBC W/AUTO DIFF WBC: CPT | Performed by: EMERGENCY MEDICINE

## 2021-01-14 PROCEDURE — 93005 ELECTROCARDIOGRAM TRACING: CPT

## 2021-01-14 PROCEDURE — 80048 BASIC METABOLIC PNL TOTAL CA: CPT | Performed by: EMERGENCY MEDICINE

## 2021-01-14 RX ORDER — SODIUM CHLORIDE 9 MG/ML
INJECTION, SOLUTION INTRAVENOUS CONTINUOUS
Status: DISCONTINUED | OUTPATIENT
Start: 2021-01-14 | End: 2021-01-14 | Stop reason: HOSPADM

## 2021-01-14 RX ADMIN — SODIUM CHLORIDE 1000 ML: 9 INJECTION, SOLUTION INTRAVENOUS at 13:29

## 2021-01-14 RX ADMIN — SODIUM CHLORIDE: 9 INJECTION, SOLUTION INTRAVENOUS at 14:33

## 2021-01-14 NOTE — ED TRIAGE NOTES
Patient reports he fell 10 days ago. Recently he started feeling dizzy and lightheaded.     Son, Andrea, brings patient in and he requests that we call him with updated rather than marisela's wife. His number is in the chart.

## 2021-01-14 NOTE — DISCHARGE INSTRUCTIONS
Please drink plenty fluids at home.    Please return to the emergency department as needed for new or worsening symptoms including increased lightheadedness or confusion, falls, focal weakness, speech changes, any other concerning symptoms.

## 2021-01-14 NOTE — ED PROVIDER NOTES
"History     Chief Complaint:  Lightheadedness      HPI  Shorty Beverly is a 85 year old male with a history of anemia, anxiety, hypertension, hyperlipidemia, and BPH who presents for evaluation of lightheadedness. Per chart review the patient suffered a fall on 01/02 where he hit his head; he states he feels a bit \"off kilter\" since. Workup at that time had unremarkable CT scan. The patient states that he woke up this morning and felt lightheaded. He walked to his living room but needed to sit down in attempt to alleviate the feeling. He notes it improved some after 7-8 minutes but he is still lightheaded and feels like his body is \"floating.\" He has never felt this way before. He has recently started donepezil for memory, and has only taken half of the bottle as directed.     He denies any headaches, nausea, vomiting, diplopia/blurred vision, vertigo, cough, fevers, shortness of breath, chest pain, appetite changes, diarrhea, constipation, dysuria, abdominal pain.     Review of Systems   ROS: 10 point ROS neg other than the symptoms noted above in the HPI.     Allergies:  Demerol  Penicillins  Valium     Medications:   Memantine  Donepezil     Medical History:   Anemia   Anxiety   Arthritis   Hyperlipidemia    Hypertension   benign prostatic hypertrophy   Tremor   Spinal stenosis   Infected prosthetic knee joint  Memory loss   Prostate cancer    Surgical History   Total knee arthroplasty - bilateral   Arthroplasty revision knee - left x2  Colonoscopy   DaVinci prostatectomy   Decompression, fusion lumbar posterior three+ levels, combined  Tonsillectomy & Adenoidectomy   Eye surgery    surgery   Hemorrhoidectomy   Trigger finger release x2   Hernia repair - right  Incision limbal relaxing, keratotomy arcuate   Phacoemulsification with standard IOL - bilateral     Family History:   Father:  MI, prostate cancer  Mother: Heart disease    Social History:  Patient presents alone  Patient is .  PCP: Shawn" Christian Emmanuel   Lives at home with his wife.     Physical Exam     Patient Vitals for the past 24 hrs:   BP Temp Pulse Resp SpO2   01/14/21 1200 (!) 142/70 96.4  F (35.8  C) 70 18 95 %          Physical Exam  Constitutional: Well developed, nontox appearance  Head: Atraumatic.   Mouth/Throat: Oropharynx is clear and moist.   Neck:  no stridor  Eyes: no scleral icterus, PERRL, EOMI  Cardiovascular: RRR, 2+ bilat radial pulses  Pulmonary/Chest: nml resp effort, Clear BS bilat  Abdominal: ND, soft, NT, no rebound or guarding   Ext: Warm, well perfused, no edema  Neurological: A&O,  CNII-XII intact, nml finger to nose, 5/5 strength throughout upper and lower ext, symmetric; sensation grossly intact, steady gait  Skin: Skin is warm and dry.   Psychiatric: Behavior is normal. Thought content normal.   Nursing note and vitals reviewed.    Emergency Department Course     ECG:  ECG taken at 1336, ECG read at 1340  Sinus bradycardia   Otherwise normal ECG  Rate 59 bpm. AR interval 202 ms. QRS duration 84 ms. QT/QTc 436/431 ms. P-R-T axes 55 -16 45.     Laboratory:    CBC: WBC 4.1, HGB 12.3 (L) ,    BMP: Chloride 111 (H)  o/w WNL (Creatinine 0.96)    Troponin: (Collected 1307) <0.015     Emergency Department Course:    Reviewed:  I reviewed nursing notes, vitals, past medical history and care everywhere    Assessments:  1345 I assessed the patient as above.   1510 Patient rechecked and updated prior to discharge    Interventions:  1329 NS 1000 mL IV     Disposition:  Discharged to home.    Impression & Plan     Medical Decision Making:  Shorty Beverly is a 85 year old male who presents lightheadedness    Differential diagnosis includes electrolyte abnormality, dehydration, orthostasis, dysrhythmia, atypical ACS.  EKG interpretation as noted above significant for minimal sinus bradycardia.  Labs demonstrated mild hyperchloremia, minimal baseline anemia, troponin undetectable.  Patient was given fluids as noted above and  orthostatics were checked which were unremarkable.  Upon recheck, the patient reports feeling improved although he has had brief episodes of intermittent lightheadedness since being in the emergency department.  Given his unremarkable work-up, steady gait and reassuring vital signs, at this time I feel he is safe for discharge.  It is possible the patient is experiencing a mild postconcussive syndrome given he states he has not felt his baseline since his fall and head injury on 1/2/2021. Given reassuring work-up,  At this time I feel the pt is safe for discharge.  Recommendations given regarding follow up with PCP and return to the emergency department as needed for new or worsening symptoms.  Pt counseled on all results, disposition and diagnosis.  They are understanding and agreeable to plan. Patient discharged in stable condition.                  Diagnosis:     ICD-10-CM    1. Lightheadedness  R42         Disposition:  Discharged to home.    Discharge Medications:  Discharge Medication List as of 1/14/2021  3:16 PM          Scribe Disclosure:  I, Martínez Gibbons, am serving as a scribe at 12:40 PM on 1/14/2021 to document services personally performed by Kp Salgado MD based on my observations and the provider's statements to me.     [facility]  January 14, 2021      Kp Salgado MD  01/14/21 0845

## 2021-01-14 NOTE — ED AVS SNAPSHOT
[facility] Emergency Dept  [address] [address]  Phone: [phone]  Fax: [phone]                                    Shorty Beverly   MRN: [id number]    Department: [facility] Emergency Dept   Date of Visit: 1/14/2021           After Visit Summary Signature Page    I have received my discharge instructions, and my questions have been answered. I have discussed any challenges I see with this plan with the nurse or doctor.    ..........................................................................................................................................  Patient/Patient Representative Signature      ..........................................................................................................................................  Patient Representative Print Name and Relationship to Patient    ..................................................               ................................................  Date                                   Time    ..........................................................................................................................................  Reviewed by Signature/Title    ...................................................              ..............................................  Date                                               Time          22EPIC Rev 08/18

## 2021-01-14 NOTE — ED NOTES
Vital signs stable, doing better iv fluids infused.  Does not have orthostatic blood pressures.  Passed the road test.

## (undated) DEVICE — DAVINCI XI DRAPE COLUMN 470341

## (undated) DEVICE — ENDO SHEARS 5MM 176643

## (undated) DEVICE — DRAIN JACKSON PRATT RESERVOIR 100ML SU130-1305

## (undated) DEVICE — SU VICRYL 0 UR-6 27" J603H

## (undated) DEVICE — Device

## (undated) DEVICE — SU ETHILON 2-0 FS 18" 664H

## (undated) DEVICE — ENDO TROCAR CONMED AIRSEAL BLADELESS 05X120MM IAS5-120LP

## (undated) DEVICE — DAVINCI XI OBTURATOR BLADELESS 8MM 470359

## (undated) DEVICE — SUCTION CANISTER MEDIVAC LINER 3000ML W/LID 65651-530

## (undated) DEVICE — SOL NACL 0.9% IRRIG 1000ML BOTTLE 07138-09

## (undated) DEVICE — DAVINCI XI DRAPE ARM 470015

## (undated) DEVICE — PACK DAVINCI UROLOGY SBA15UDFSG

## (undated) DEVICE — DAVINCI XI NDL DRIVER LARGE 470006

## (undated) DEVICE — LINEN TOWEL PACK X5 5464

## (undated) DEVICE — ENDO POUCH GOLD 10MM ECATCH 173050G

## (undated) DEVICE — SU MONOCRYL 3-0 RB-1 27" Y305H

## (undated) DEVICE — DAVINCI XI MONOPOLAR SCISSORS HOT SHEARS 8MM 470179

## (undated) DEVICE — KIT PATIENT POSITIONING PIGAZZI LATEX FREE 40580

## (undated) DEVICE — GRASPER LAPAROSCOPIC EPIX 5MMX35CM C4130

## (undated) DEVICE — SU WND CLOSURE VLOC 90 ABS 3-0 VIOLET 6" CV-23 VLOCM0804

## (undated) DEVICE — CATH FOLEY 20FR 5ML SILVER COAT LATEX 0165SI20

## (undated) DEVICE — SU VICRYL 0 CT-1 27" J340H

## (undated) DEVICE — SOL NACL 0.9% INJ 1000ML BAG 2B1324X

## (undated) DEVICE — CLIP ENDO HEMO-LOC GREEN MED/LG 544230

## (undated) DEVICE — DAVINCI HOT SHEARS TIP COVER  400180

## (undated) DEVICE — SU WND CLOSURE V-LOC 3-0 CV-23 6" VLOCM1904

## (undated) DEVICE — SURGICEL HEMOSTAT 3X4" NUKNIT 1943

## (undated) DEVICE — ESU VESSEL SEALER ENSEAL ARTICULATING 35CM CVD NSLG2C35A

## (undated) DEVICE — CATH TRAY FOLEY SURESTEP 16FR W/URNE MTR STLK LATEX A303316A

## (undated) DEVICE — SU VICRYL 2-0 SH 27" J317H

## (undated) DEVICE — NDL INSUFFLATION 14GA STEP S100000

## (undated) DEVICE — SU VICRYL 0 UR-5 27" J376H

## (undated) DEVICE — ENDO TROCAR 12MM VERSASTEP VS101012P

## (undated) DEVICE — DRSG GAUZE 4X4" TOPPER

## (undated) DEVICE — DAVINCI XI SEAL UNIVERSAL 5-8MM 470361

## (undated) DEVICE — GLOVE PROTEXIS W/NEU-THERA 7.5  2D73TE75

## (undated) DEVICE — SU VICRYL 2-0 UR-6 27" J602H

## (undated) DEVICE — DAVINCI XI GRASPER ENDOWRIST PROGRASP 470093

## (undated) DEVICE — DRAIN CHANNEL ROUND 15FR FLUTED 072188

## (undated) DEVICE — SUCTION IRR TRUMPET VALVE LAP ASU1201

## (undated) DEVICE — PREP CHLORAPREP 26ML TINTED ORANGE  260815

## (undated) DEVICE — TUBING CONMED AIRSEAL SMOKE EVAC INSUFFLATION ASM-EVAC

## (undated) RX ORDER — PROPOFOL 10 MG/ML
INJECTION, EMULSION INTRAVENOUS
Status: DISPENSED
Start: 2017-09-27

## (undated) RX ORDER — CEFAZOLIN SODIUM 1 G/3ML
INJECTION, POWDER, FOR SOLUTION INTRAMUSCULAR; INTRAVENOUS
Status: DISPENSED
Start: 2017-09-27

## (undated) RX ORDER — VECURONIUM BROMIDE 1 MG/ML
INJECTION, POWDER, LYOPHILIZED, FOR SOLUTION INTRAVENOUS
Status: DISPENSED
Start: 2017-09-27

## (undated) RX ORDER — CEFAZOLIN SODIUM 2 G/100ML
INJECTION, SOLUTION INTRAVENOUS
Status: DISPENSED
Start: 2017-09-27

## (undated) RX ORDER — HYDROMORPHONE HYDROCHLORIDE 1 MG/ML
INJECTION, SOLUTION INTRAMUSCULAR; INTRAVENOUS; SUBCUTANEOUS
Status: DISPENSED
Start: 2017-09-27

## (undated) RX ORDER — BUPIVACAINE HYDROCHLORIDE 2.5 MG/ML
INJECTION, SOLUTION EPIDURAL; INFILTRATION; INTRACAUDAL
Status: DISPENSED
Start: 2017-09-27

## (undated) RX ORDER — FENTANYL CITRATE 50 UG/ML
INJECTION, SOLUTION INTRAMUSCULAR; INTRAVENOUS
Status: DISPENSED
Start: 2017-09-27

## (undated) RX ORDER — LIDOCAINE HYDROCHLORIDE 20 MG/ML
INJECTION, SOLUTION EPIDURAL; INFILTRATION; INTRACAUDAL; PERINEURAL
Status: DISPENSED
Start: 2017-09-27

## (undated) RX ORDER — DEXAMETHASONE SODIUM PHOSPHATE 4 MG/ML
INJECTION, SOLUTION INTRA-ARTICULAR; INTRALESIONAL; INTRAMUSCULAR; INTRAVENOUS; SOFT TISSUE
Status: DISPENSED
Start: 2017-09-27

## (undated) RX ORDER — GLYCOPYRROLATE 0.2 MG/ML
INJECTION, SOLUTION INTRAMUSCULAR; INTRAVENOUS
Status: DISPENSED
Start: 2017-09-27

## (undated) RX ORDER — ONDANSETRON 2 MG/ML
INJECTION INTRAMUSCULAR; INTRAVENOUS
Status: DISPENSED
Start: 2017-09-27